# Patient Record
Sex: FEMALE | Race: OTHER | Employment: FULL TIME | URBAN - METROPOLITAN AREA
[De-identification: names, ages, dates, MRNs, and addresses within clinical notes are randomized per-mention and may not be internally consistent; named-entity substitution may affect disease eponyms.]

---

## 2017-01-09 RX ORDER — CITALOPRAM 20 MG/1
TABLET ORAL
Qty: 90 TABLET | Refills: 0 | Status: SHIPPED | OUTPATIENT
Start: 2017-01-09 | End: 2017-05-04 | Stop reason: SDUPTHER

## 2017-01-10 RX ORDER — NORGESTIMATE AND ETHINYL ESTRADIOL 0.25-0.035
KIT ORAL
Qty: 28 TABLET | Refills: 11 | Status: SHIPPED | OUTPATIENT
Start: 2017-01-10 | End: 2017-05-04 | Stop reason: SDUPTHER

## 2017-02-15 ENCOUNTER — OFFICE VISIT (OUTPATIENT)
Dept: INTERNAL MEDICINE CLINIC | Age: 22
End: 2017-02-15

## 2017-02-15 ENCOUNTER — TELEPHONE (OUTPATIENT)
Dept: FAMILY MEDICINE CLINIC | Age: 22
End: 2017-02-15

## 2017-02-15 VITALS
WEIGHT: 287 LBS | HEIGHT: 62 IN | DIASTOLIC BLOOD PRESSURE: 76 MMHG | BODY MASS INDEX: 52.81 KG/M2 | HEART RATE: 73 BPM | SYSTOLIC BLOOD PRESSURE: 124 MMHG | TEMPERATURE: 98 F | OXYGEN SATURATION: 100 %

## 2017-02-15 DIAGNOSIS — Z23 NEED FOR PROPHYLACTIC VACCINATION AGAINST STREPTOCOCCUS PNEUMONIAE (PNEUMOCOCCUS): ICD-10-CM

## 2017-02-15 DIAGNOSIS — B37.9 ANTIBIOTIC-INDUCED YEAST INFECTION: ICD-10-CM

## 2017-02-15 DIAGNOSIS — J45.20 MILD INTERMITTENT ASTHMA WITHOUT COMPLICATION: ICD-10-CM

## 2017-02-15 DIAGNOSIS — J01.10 ACUTE NON-RECURRENT FRONTAL SINUSITIS: Primary | ICD-10-CM

## 2017-02-15 DIAGNOSIS — Z23 NEEDS FLU SHOT: ICD-10-CM

## 2017-02-15 DIAGNOSIS — T36.95XA ANTIBIOTIC-INDUCED YEAST INFECTION: ICD-10-CM

## 2017-02-15 PROCEDURE — 90471 IMMUNIZATION ADMIN: CPT | Performed by: NURSE PRACTITIONER

## 2017-02-15 PROCEDURE — 99213 OFFICE O/P EST LOW 20 MIN: CPT | Performed by: NURSE PRACTITIONER

## 2017-02-15 PROCEDURE — 90732 PPSV23 VACC 2 YRS+ SUBQ/IM: CPT | Performed by: NURSE PRACTITIONER

## 2017-02-15 PROCEDURE — 90472 IMMUNIZATION ADMIN EACH ADD: CPT | Performed by: NURSE PRACTITIONER

## 2017-02-15 PROCEDURE — 90686 IIV4 VACC NO PRSV 0.5 ML IM: CPT | Performed by: NURSE PRACTITIONER

## 2017-02-15 RX ORDER — AMOXICILLIN AND CLAVULANATE POTASSIUM 875; 125 MG/1; MG/1
1 TABLET, FILM COATED ORAL 2 TIMES DAILY
Qty: 20 TABLET | Refills: 0 | Status: SHIPPED | OUTPATIENT
Start: 2017-02-15 | End: 2017-02-25

## 2017-02-15 RX ORDER — ALBUTEROL SULFATE 90 UG/1
2 AEROSOL, METERED RESPIRATORY (INHALATION) EVERY 6 HOURS PRN
Qty: 1 INHALER | Refills: 1 | Status: SHIPPED | OUTPATIENT
Start: 2017-02-15 | End: 2021-11-04

## 2017-02-15 RX ORDER — FLUCONAZOLE 150 MG/1
150 TABLET ORAL ONCE
Qty: 1 TABLET | Refills: 0 | Status: SHIPPED | OUTPATIENT
Start: 2017-02-15 | End: 2017-02-15

## 2017-02-15 ASSESSMENT — ENCOUNTER SYMPTOMS
WHEEZING: 0
SORE THROAT: 1
SINUS PRESSURE: 1
RHINORRHEA: 1
SHORTNESS OF BREATH: 1
HEMOPTYSIS: 0
HEARTBURN: 0
COUGH: 1

## 2017-05-04 RX ORDER — NORGESTIMATE AND ETHINYL ESTRADIOL 0.25-0.035
KIT ORAL
Qty: 84 TABLET | Refills: 0 | Status: SHIPPED | OUTPATIENT
Start: 2017-05-04 | End: 2017-07-05 | Stop reason: SDUPTHER

## 2017-05-04 RX ORDER — CITALOPRAM 20 MG/1
TABLET ORAL
Qty: 90 TABLET | Refills: 0 | Status: SHIPPED | OUTPATIENT
Start: 2017-05-04 | End: 2017-07-05 | Stop reason: SDUPTHER

## 2017-07-06 RX ORDER — NORGESTIMATE AND ETHINYL ESTRADIOL 0.25-0.035
KIT ORAL
Qty: 84 TABLET | Refills: 0 | Status: SHIPPED | OUTPATIENT
Start: 2017-07-06 | End: 2017-09-25 | Stop reason: SDUPTHER

## 2017-07-06 RX ORDER — CITALOPRAM 20 MG/1
TABLET ORAL
Qty: 90 TABLET | Refills: 0 | Status: SHIPPED | OUTPATIENT
Start: 2017-07-06 | End: 2017-10-12 | Stop reason: SDUPTHER

## 2017-08-17 ENCOUNTER — OFFICE VISIT (OUTPATIENT)
Dept: FAMILY MEDICINE CLINIC | Age: 22
End: 2017-08-17

## 2017-08-17 VITALS
BODY MASS INDEX: 52.93 KG/M2 | SYSTOLIC BLOOD PRESSURE: 124 MMHG | WEIGHT: 289.4 LBS | DIASTOLIC BLOOD PRESSURE: 94 MMHG | OXYGEN SATURATION: 98 % | HEART RATE: 68 BPM

## 2017-08-17 DIAGNOSIS — R42 EPISODIC LIGHTHEADEDNESS: ICD-10-CM

## 2017-08-17 DIAGNOSIS — K21.9 GASTROESOPHAGEAL REFLUX DISEASE, ESOPHAGITIS PRESENCE NOT SPECIFIED: ICD-10-CM

## 2017-08-17 DIAGNOSIS — R11.0 NAUSEA: ICD-10-CM

## 2017-08-17 DIAGNOSIS — R10.13 EPIGASTRIC PAIN: ICD-10-CM

## 2017-08-17 DIAGNOSIS — K92.1 BLOOD IN STOOL: ICD-10-CM

## 2017-08-17 DIAGNOSIS — K92.1 BLOOD IN STOOL: Primary | ICD-10-CM

## 2017-08-17 LAB
BASOPHILS ABSOLUTE: 0 K/UL (ref 0–0.2)
BASOPHILS RELATIVE PERCENT: 0.5 %
EOSINOPHILS ABSOLUTE: 0.1 K/UL (ref 0–0.6)
EOSINOPHILS RELATIVE PERCENT: 1.3 %
HCG QUALITATIVE: NEGATIVE
HCT VFR BLD CALC: 36.2 % (ref 36–48)
HEMOGLOBIN: 11.6 G/DL (ref 12–16)
HGB, POC: 11.2
LYMPHOCYTES ABSOLUTE: 2.7 K/UL (ref 1–5.1)
LYMPHOCYTES RELATIVE PERCENT: 35.1 %
MCH RBC QN AUTO: 28.4 PG (ref 26–34)
MCHC RBC AUTO-ENTMCNC: 32.1 G/DL (ref 31–36)
MCV RBC AUTO: 88.4 FL (ref 80–100)
MONOCYTES ABSOLUTE: 0.5 K/UL (ref 0–1.3)
MONOCYTES RELATIVE PERCENT: 6.4 %
NEUTROPHILS ABSOLUTE: 4.3 K/UL (ref 1.7–7.7)
NEUTROPHILS RELATIVE PERCENT: 56.7 %
PDW BLD-RTO: 15.8 % (ref 12.4–15.4)
PLATELET # BLD: 281 K/UL (ref 135–450)
PMV BLD AUTO: 8.9 FL (ref 5–10.5)
RBC # BLD: 4.09 M/UL (ref 4–5.2)
WBC # BLD: 7.6 K/UL (ref 4–11)

## 2017-08-17 PROCEDURE — 85018 HEMOGLOBIN: CPT | Performed by: FAMILY MEDICINE

## 2017-08-17 PROCEDURE — 99214 OFFICE O/P EST MOD 30 MIN: CPT | Performed by: FAMILY MEDICINE

## 2017-08-17 RX ORDER — OMEPRAZOLE 40 MG/1
40 CAPSULE, DELAYED RELEASE ORAL DAILY
Qty: 30 CAPSULE | Refills: 3 | Status: SHIPPED | OUTPATIENT
Start: 2017-08-17 | End: 2017-09-12 | Stop reason: SDUPTHER

## 2017-08-17 RX ORDER — PROMETHAZINE HYDROCHLORIDE 25 MG/1
25 TABLET ORAL EVERY 8 HOURS PRN
Qty: 20 TABLET | Refills: 0 | Status: SHIPPED | OUTPATIENT
Start: 2017-08-17 | End: 2017-08-24

## 2017-08-17 ASSESSMENT — PATIENT HEALTH QUESTIONNAIRE - PHQ9
SUM OF ALL RESPONSES TO PHQ9 QUESTIONS 1 & 2: 5
7. TROUBLE CONCENTRATING ON THINGS, SUCH AS READING THE NEWSPAPER OR WATCHING TELEVISION: 3
9. THOUGHTS THAT YOU WOULD BE BETTER OFF DEAD, OR OF HURTING YOURSELF: 0
6. FEELING BAD ABOUT YOURSELF - OR THAT YOU ARE A FAILURE OR HAVE LET YOURSELF OR YOUR FAMILY DOWN: 2
5. POOR APPETITE OR OVEREATING: 1
3. TROUBLE FALLING OR STAYING ASLEEP: 3
SUM OF ALL RESPONSES TO PHQ QUESTIONS 1-9: 18
10. IF YOU CHECKED OFF ANY PROBLEMS, HOW DIFFICULT HAVE THESE PROBLEMS MADE IT FOR YOU TO DO YOUR WORK, TAKE CARE OF THINGS AT HOME, OR GET ALONG WITH OTHER PEOPLE: 0
1. LITTLE INTEREST OR PLEASURE IN DOING THINGS: 3
4. FEELING TIRED OR HAVING LITTLE ENERGY: 3
8. MOVING OR SPEAKING SO SLOWLY THAT OTHER PEOPLE COULD HAVE NOTICED. OR THE OPPOSITE, BEING SO FIGETY OR RESTLESS THAT YOU HAVE BEEN MOVING AROUND A LOT MORE THAN USUAL: 1
2. FEELING DOWN, DEPRESSED OR HOPELESS: 2

## 2017-08-18 LAB
A/G RATIO: 1.4 (ref 1.1–2.2)
ALBUMIN SERPL-MCNC: 3.7 G/DL (ref 3.4–5)
ALP BLD-CCNC: 101 U/L (ref 40–129)
ALT SERPL-CCNC: 28 U/L (ref 10–40)
ANION GAP SERPL CALCULATED.3IONS-SCNC: 4 MMOL/L (ref 3–16)
AST SERPL-CCNC: 20 U/L (ref 15–37)
BILIRUB SERPL-MCNC: <0.2 MG/DL (ref 0–1)
BUN BLDV-MCNC: 10 MG/DL (ref 7–20)
CALCIUM SERPL-MCNC: 8.9 MG/DL (ref 8.3–10.6)
CHLORIDE BLD-SCNC: 106 MMOL/L (ref 99–110)
CO2: 24 MMOL/L (ref 21–32)
CREAT SERPL-MCNC: <0.5 MG/DL (ref 0.6–1.1)
ESTIMATED AVERAGE GLUCOSE: 108.3 MG/DL
GFR AFRICAN AMERICAN: >60
GFR NON-AFRICAN AMERICAN: >60
GLOBULIN: 2.7 G/DL
GLUCOSE BLD-MCNC: 97 MG/DL (ref 70–99)
HBA1C MFR BLD: 5.4 %
LIPASE: 13 U/L (ref 13–60)
POTASSIUM SERPL-SCNC: 3.9 MMOL/L (ref 3.5–5.1)
SODIUM BLD-SCNC: 134 MMOL/L (ref 136–145)
T4 FREE: 1.2 NG/DL (ref 0.9–1.8)
TOTAL PROTEIN: 6.4 G/DL (ref 6.4–8.2)
TSH REFLEX: 4.44 UIU/ML (ref 0.27–4.2)

## 2017-08-21 ENCOUNTER — TELEPHONE (OUTPATIENT)
Dept: FAMILY MEDICINE CLINIC | Age: 22
End: 2017-08-21

## 2017-09-08 ENCOUNTER — TELEPHONE (OUTPATIENT)
Dept: FAMILY MEDICINE CLINIC | Age: 22
End: 2017-09-08

## 2017-09-08 DIAGNOSIS — R10.13 EPIGASTRIC PAIN: ICD-10-CM

## 2017-09-08 DIAGNOSIS — R11.0 NAUSEA: ICD-10-CM

## 2017-09-08 NOTE — TELEPHONE ENCOUNTER
90 day supply request for authorization omeprazole (PRILOSEC) 40 MG delayed release capsule if appropriate.

## 2017-09-12 RX ORDER — OMEPRAZOLE 40 MG/1
40 CAPSULE, DELAYED RELEASE ORAL DAILY
Qty: 90 CAPSULE | Refills: 1 | Status: SHIPPED | OUTPATIENT
Start: 2017-09-12 | End: 2018-07-06 | Stop reason: ALTCHOICE

## 2017-09-12 NOTE — TELEPHONE ENCOUNTER
Spoke with pharmacy and they stated they can't check to see if it's been approved since they have not received an Rx for a 90 day supply.  Medication teed up for #90

## 2017-09-12 NOTE — TELEPHONE ENCOUNTER
Incoming fax from [facility] would like to know if 90 days supply for Omeprazole Dr Raz Elliott has been approved?  Please advise

## 2017-10-04 ENCOUNTER — TELEPHONE (OUTPATIENT)
Dept: FAMILY MEDICINE CLINIC | Age: 22
End: 2017-10-04

## 2017-10-04 NOTE — TELEPHONE ENCOUNTER
Patient called in regarding FMLA paperwork. Pt states that fmla forms should have been sent over today from her employer, and she will need the paperwork filled out in order to leave work for scheduled appointments with a GI Specialist. Please advise.

## 2017-10-04 NOTE — TELEPHONE ENCOUNTER
Nothing scanned into chart yet. Please forward to Dr. Marquita Martinez once forms have been received.

## 2017-10-05 ENCOUNTER — PAT TELEPHONE (OUTPATIENT)
Dept: PREADMISSION TESTING | Age: 22
End: 2017-10-05

## 2017-10-05 NOTE — PRE-PROCEDURE INSTRUCTIONS
[address] [facility] time____________        Surgery time____________    Take the following medications with a sip of water:    Do not eat or drink anything after 12:00 midnight prior to your surgery. This includes water chewing gum, mints and ice chips. You may brush your teeth and gargle the morning of your surgery, but do not swallow the water     Please see your family doctor/pediatrician for a history and physical and/or concerning medications. Bring any test results/reports from your physicians office. If you are under the care of a heart doctor or specialist doctor, please be aware that you may be asked to them for clearance    You may be asked to stop blood thinners such as Coumadin, Plavix, Fragmin, Lovenox, etc., or any anti-inflammatories such as:  Aspirin, Ibuprofen, Advil, Naproxen prior to your surgery. We also ask that you stop any OTC medications such as fish oil, vitamin E, glucosamine, garlic, Multivitamins, COQ 10, etc.    We ask that you do not smoke 24 hours prior to surgery  We ask that you do not  drink any alcoholic beverages 24 hours prior to surgery     You must make arrangements for a responsible adult to take you home after your surgery. For your safety you will not be allowed to leave alone or drive yourself home. Your surgery will be cancelled if you do not have a ride home. Also for your safety, it is strongly suggested that someone stay with you the first 24 hours after your surgery. A parent or legal guardian must accompany a child scheduled for surgery and plan to stay at the hospital until the child is discharged. Please do not bring other children with you. For your comfort, please wear simple loose fitting clothing to the hospital.  Please do not bring valuables.     Do not wear any make-up or nail polish on your fingers or toes      For your safety, please do not wear any jewelry or body piercing's on the day of surgery. All jewelry must be removed. If you have dentures, they will be removed before going to operating room. For your convenience, we will provide you with a container. If you wear contact lenses or glasses, they will be removed, please bring a case for them. If you have a living will and a durable power of  for healthcare, please bring in a copy. As part of our patient safety program to minimize surgical site infections, we ask you to do the following:    · Please notify your surgeon if you develop any illness between         now and the  day of your surgery. · This includes a cough, cold, fever, sore throat, nausea,         or vomiting, and diarrhea, etc.  ·  Please notify your surgeon if you experience dizziness, shortness         of breath or blurred vision between now and the time of your surgery. Do not shave your operative site 96 hours prior to surgery. For face and neck surgery, men may use an electric razor 48 hours   prior to surgery. You may shower the night before surgery or the morning of   your surgery with an antibacterial soap. You will need to bring a photo ID and insurance card    [facility] has an onsite pharmacy, would you like to utilize our pharmacy     If you will be staying overnight and use a C-pap machine, please bring   your C-pap to hospital     Our goal is to provide you with excellent care, therefore, visitors will be limited to two(2) in the room at a time so that we may focus on providing this care for you. Please contact pre-admission testing if you have any further questions. [facility] phone number:  [phone] [facility] fax number:  [phone]  Please note these are generalized instructions for all surgical cases, you may be provided with more specific instructions according to your surgery.

## 2017-10-06 ENCOUNTER — SURG/PROC ORDERS (OUTPATIENT)
Dept: ANESTHESIOLOGY | Age: 22
End: 2017-10-06

## 2017-10-06 RX ORDER — SODIUM CHLORIDE 0.9 % (FLUSH) 0.9 %
10 SYRINGE (ML) INJECTION EVERY 12 HOURS SCHEDULED
Status: CANCELLED | OUTPATIENT
Start: 2017-10-06

## 2017-10-06 RX ORDER — SODIUM CHLORIDE 0.9 % (FLUSH) 0.9 %
10 SYRINGE (ML) INJECTION PRN
Status: CANCELLED | OUTPATIENT
Start: 2017-10-06

## 2017-10-06 RX ORDER — SODIUM CHLORIDE 9 MG/ML
INJECTION, SOLUTION INTRAVENOUS CONTINUOUS
Status: CANCELLED | OUTPATIENT
Start: 2017-10-06

## 2017-10-09 ENCOUNTER — HOSPITAL ENCOUNTER (OUTPATIENT)
Dept: ENDOSCOPY | Age: 22
Discharge: HOME | End: 2017-10-09
Attending: INTERNAL MEDICINE | Admitting: INTERNAL MEDICINE

## 2017-10-09 VITALS
DIASTOLIC BLOOD PRESSURE: 79 MMHG | RESPIRATION RATE: 12 BRPM | HEART RATE: 75 BPM | TEMPERATURE: 97.8 F | SYSTOLIC BLOOD PRESSURE: 130 MMHG | OXYGEN SATURATION: 100 %

## 2017-10-09 LAB — HCG QUALITATIVE: NEGATIVE

## 2017-10-09 RX ORDER — ONDANSETRON 2 MG/ML
4 INJECTION INTRAMUSCULAR; INTRAVENOUS
Status: ACTIVE | OUTPATIENT
Start: 2017-10-09 | End: 2017-10-09

## 2017-10-09 RX ORDER — SODIUM CHLORIDE 9 MG/ML
INJECTION, SOLUTION INTRAVENOUS CONTINUOUS
Status: DISCONTINUED | OUTPATIENT
Start: 2017-10-09 | End: 2017-10-10 | Stop reason: HOSPADM

## 2017-10-09 RX ORDER — SODIUM CHLORIDE 0.9 % (FLUSH) 0.9 %
10 SYRINGE (ML) INJECTION PRN
Status: DISCONTINUED | OUTPATIENT
Start: 2017-10-09 | End: 2017-10-10 | Stop reason: HOSPADM

## 2017-10-09 RX ORDER — SODIUM CHLORIDE 0.9 % (FLUSH) 0.9 %
10 SYRINGE (ML) INJECTION EVERY 12 HOURS SCHEDULED
Status: DISCONTINUED | OUTPATIENT
Start: 2017-10-09 | End: 2017-10-10 | Stop reason: HOSPADM

## 2017-10-09 RX ADMIN — SODIUM CHLORIDE: 9 INJECTION, SOLUTION INTRAVENOUS at 10:54

## 2017-10-09 ASSESSMENT — PAIN SCALES - GENERAL
PAINLEVEL_OUTOF10: 0

## 2017-10-09 ASSESSMENT — ENCOUNTER SYMPTOMS: SHORTNESS OF BREATH: 0

## 2017-10-09 ASSESSMENT — LIFESTYLE VARIABLES: SMOKING_STATUS: 0

## 2017-10-09 NOTE — PROGRESS NOTES
Received from Endo. VSS. IVF KVO. No c/o pain. abd soft . Arouses easily.   Electronically signed by Dejon Elizondo RN on 10/9/2017 at 12:01 PM

## 2017-10-09 NOTE — PROGRESS NOTES
Discharge instructions given to pt and family. All verbalize an understanding of instructions. IV d/c'd. Up to chair. Gait steady.

## 2017-10-09 NOTE — H&P
Pre-operative History and Physical    Patient: Thomas Anne  : 1995  Acct#:     Intended Procedure:  EGD/Colonoscopy    HISTORY OF PRESENT ILLNESS:  The patient is a 19 y.o. female  who presents for/due to Nausea/Rectal bleeding      Past Medical History:        Diagnosis Date    Asthma     GERD (gastroesophageal reflux disease)     Morbid obesity ([address]     Otitis media     frequent    Polycystic ovarian disease     Vaginal yeast infection     frequently     Past Surgical History:        Procedure Laterality Date    UPPER GASTROINTESTINAL ENDOSCOPY       Medications Prior to Admission:   Current Outpatient Prescriptions on File Prior to Encounter   Medication Sig Dispense Refill    [address] [address] 0.25-35 MG-MCG per tablet TAKE 1 TABLET BY MOUTH EVERY DAY 84 tablet 2    omeprazole (PRILOSEC) 40 MG delayed release capsule Take 1 capsule by mouth daily 90 capsule 1    metFORMIN (GLUCOPHAGE) 500 MG tablet TAKE 1 TABLET BY MOUTH ONE TIME A DAY WITH BREAKFAST 30 tablet 0    citalopram (CELEXA) 20 MG tablet TAKE 1 TABLET BY MOUTH EVERY DAY 90 tablet 0    albuterol sulfate HFA (PROAIR HFA) 108 (90 BASE) MCG/ACT inhaler Inhale 2 puffs into the lungs every 6 hours as needed for Wheezing 1 Inhaler 1    ondansetron (ZOFRAN) 4 MG tablet Take 1 tablet by mouth daily as needed for Nausea or Vomiting 30 tablet 0    traZODone (DESYREL) 50 MG tablet TAKE 1-2 TABLETS BY MOUTH EVERY DAY IN THE EVENING 60 tablet 3     No current facility-administered medications on file prior to encounter. Allergies:  Review of patient's allergies indicates no known allergies. Social History:   TOBACCO:   reports that she has never smoked. She has never used smokeless tobacco.  ETOH:   reports that she does not drink alcohol. DRUGS:   reports that she does not use drugs. PHYSICAL EXAM:      Vital Signs: There were no vitals taken for this visit. Airway: No stridor or wheezing noted.   Good air

## 2017-10-09 NOTE — ANESTHESIA POST-OP
[facility] Department of Anesthesiology  Post-Anesthesia Note       Name:  Teddy Villeda                                         Age:  19 y.o.   MRN:  [id number]     Last Vitals & Oxygen Saturation: /73   Pulse 83   Temp 97.8 °F (36.6 °C) (Temporal)   Resp 16   SpO2 99%   Patient Vitals for the past 4 hrs:   BP Temp Temp src Pulse Resp SpO2   10/09/17 1216 122/73 97.8 °F (36.6 °C) Temporal 83 16 99 %   10/09/17 1210 - - - 62 17 100 %   10/09/17 1206 - 97.6 °F (36.4 °C) Temporal - - -   10/09/17 1205 (!) 104/54 - - 82 16 -   10/09/17 1200 (!) 100/51 - - 80 18 100 %   10/09/17 1155 (!) 97/51 - - 78 21 100 %   10/09/17 1151 (!) 95/48 97.6 °F (36.4 °C) Temporal 83 11 100 %       Level of consciousness: awake, alert and oriented    Respiratory: stable     Cardiovascular: stable     Hydration: stable     PONV: stable     Post-op pain: adequate analgesia    Post-op assessment: no apparent anesthetic complications and tolerated procedure well    Complications:  none    Elba Major MD  October 9, 2017   12:35 PM

## 2017-10-09 NOTE — PROGRESS NOTES
Arouses easily. abd soft. VSS. IVF KVO.    Electronically signed by Shira Barba RN on 10/9/2017 at 12:07 PM

## 2017-10-09 NOTE — PROGRESS NOTES
1.  Patient is identified using name and date of birth. 2.  The patient is free from signs and symptoms of injury. 3.  The patient receives appropriate medication(s), safely administered during the perioperative period. 4.  The patient had wound/tissuue perfusion consistent with or improved from baseline levels established preoperatively. 5.  The patient is at or returning to normothermia at the conclusion of the immediate postoperative period. 6.  The patient's fluid, electrolyte, and acid base balances are consistent with or improved from baseline levels established preoperatively. 7.  The patient's pulmonary function is consistent with or improved from baseline levels established preoperatively. 8.  The patient's cardiovascular status is consistent with or improved from baseline levels established preoperatively. 9.  The patient/caregiver participates in decisions affecting his or her perioperative care. 10. The patient's care is consistent with the individualized perioperative plan of care. 11. The patient's right to privacy is maintained. 12. The patient is the recipient of competent and ethical care within legal standards of practice. 13.  The patient's value system, lifestyle, ethnicity, and culture are considered, respected, and incorporated in the perioperative plan of care. 14.  The patient demonstrates and/or reports adequate pain control throughout the perioperative period. 15. The patient's neurological status is consistent with or improved from baseline levels established preoperatively. 16.  The patient/caregiver demonstrates knowledge of the expected responses to the operative or invasive procedure. 16.  Patient/caregiver has reduced anxiety. Interventions - familiarize with environment and equipment.

## 2017-10-11 ENCOUNTER — TELEPHONE (OUTPATIENT)
Dept: FAMILY MEDICINE CLINIC | Age: 22
End: 2017-10-11

## 2017-10-13 RX ORDER — CITALOPRAM 20 MG/1
TABLET ORAL
Qty: 90 TABLET | Refills: 0 | Status: SHIPPED | OUTPATIENT
Start: 2017-10-13 | End: 2018-01-16 | Stop reason: SDUPTHER

## 2017-10-27 ENCOUNTER — TELEPHONE (OUTPATIENT)
Dept: FAMILY MEDICINE CLINIC | Age: 22
End: 2017-10-27

## 2017-10-27 NOTE — TELEPHONE ENCOUNTER
Pt's mother is calling says that Dr. Lawrence Werner needs to change the frequency on [employer] forms regarding how often Petros Munguia should be allowed to take off. Would like 3 times weeks reason Mom has a lot of appointment to attend.

## 2017-10-30 ENCOUNTER — TELEPHONE (OUTPATIENT)
Dept: FAMILY MEDICINE CLINIC | Age: 22
End: 2017-10-30

## 2017-10-30 ENCOUNTER — OFFICE VISIT (OUTPATIENT)
Dept: FAMILY MEDICINE CLINIC | Age: 22
End: 2017-10-30

## 2017-10-30 VITALS
RESPIRATION RATE: 15 BRPM | SYSTOLIC BLOOD PRESSURE: 128 MMHG | DIASTOLIC BLOOD PRESSURE: 82 MMHG | HEART RATE: 75 BPM | WEIGHT: 292.4 LBS | OXYGEN SATURATION: 95 % | BODY MASS INDEX: 51.8 KG/M2

## 2017-10-30 DIAGNOSIS — N89.8 VAGINAL DISCHARGE: Primary | ICD-10-CM

## 2017-10-30 DIAGNOSIS — M79.2 NERVE PAIN: ICD-10-CM

## 2017-10-30 DIAGNOSIS — K62.5 RECTAL BLEEDING: ICD-10-CM

## 2017-10-30 DIAGNOSIS — R00.2 PALPITATION: ICD-10-CM

## 2017-10-30 DIAGNOSIS — R11.2 NON-INTRACTABLE VOMITING WITH NAUSEA, UNSPECIFIED VOMITING TYPE: ICD-10-CM

## 2017-10-30 PROCEDURE — 90471 IMMUNIZATION ADMIN: CPT | Performed by: FAMILY MEDICINE

## 2017-10-30 PROCEDURE — 99214 OFFICE O/P EST MOD 30 MIN: CPT | Performed by: FAMILY MEDICINE

## 2017-10-30 PROCEDURE — 90630 INFLUENZA, QUADV, 18-64 YRS, ID, PF, MICRO INJ, 0.1ML (FLUZONE QUADV, PF): CPT | Performed by: FAMILY MEDICINE

## 2017-10-30 RX ORDER — METRONIDAZOLE 500 MG/1
500 TABLET ORAL 2 TIMES DAILY
Qty: 14 TABLET | Refills: 0 | Status: SHIPPED | OUTPATIENT
Start: 2017-10-30 | End: 2017-11-06

## 2017-10-30 NOTE — TELEPHONE ENCOUNTER
Patient saw Dr. Diamond Loomis today and received an antibiotic. She has a history of getting yeast infections after taking antibiotics. Can Dr. Diamond Loomis prescribe something for a yeast infection as a precaution? [facility] pharmacy confirmed.

## 2017-10-30 NOTE — TELEPHONE ENCOUNTER
Traci Bermudez returned call and states that she sees specialists with [facility], podiatry, gastroenterology, urology, and nephrology who are not in the [facility] system. Will Dr. Davis Matias reconsider?

## 2017-10-30 NOTE — TELEPHONE ENCOUNTER
Please find out dates of office visits in 2017 from all her specialists. This enc needs to be closed and reopened/copied under her mom's chart since it is pertaining to her. Please send back to me when we have dates of all of these visits.

## 2017-10-30 NOTE — PROGRESS NOTES
DAY 90 tablet 0    SPRINTEC 28 0.25-35 MG-MCG per tablet TAKE 1 TABLET BY MOUTH EVERY DAY 84 tablet 2    omeprazole (PRILOSEC) 40 MG delayed release capsule Take 1 capsule by mouth daily 90 capsule 1    metFORMIN (GLUCOPHAGE) 500 MG tablet TAKE 1 TABLET BY MOUTH ONE TIME A DAY WITH BREAKFAST 30 tablet 0    albuterol sulfate HFA (PROAIR HFA) 108 (90 BASE) MCG/ACT inhaler Inhale 2 puffs into the lungs every 6 hours as needed for Wheezing 1 Inhaler 1    ondansetron (ZOFRAN) 4 MG tablet Take 1 tablet by mouth daily as needed for Nausea or Vomiting 30 tablet 0    traZODone (DESYREL) 50 MG tablet TAKE 1-2 TABLETS BY MOUTH EVERY DAY IN THE EVENING 60 tablet 3     No current facility-administered medications for this visit. OBJECTIVE:  /82 (Site: Left Arm, Position: Sitting, Cuff Size: Large Adult)   Pulse 75   Resp 15   Wt 292 lb 6.4 oz (132.6 kg)   LMP 10/05/2017 (Exact Date)   SpO2 95%   BMI 51.80 kg/m²   GEN:  WDWN, NAD, morbidly obese  HEENT:  NCAT, TM/OP nl, PERRL, EOMI. NECK:  Supple without adenopathy. CV:  Regular rate and rhythm, S1 and S2 normal, no murmurs, clicks, gallops or rubs. No edema. PULM:  Chest is clear, no wheezing or rales. Normal symmetric air entry throughout both lung fields. ABD: soft, non-tender, non-distended. Normal bowel sounds. No organomegaly   : thin white milky malodorous discharge noted. Excoriations present on external genitalia. No rash present. PSYCH: normal mood and affect. Intact judgement and insight  NEURO: A&O x 3. MS 5/5 in all extremities. Normal gait    ASSESSMENT/PLAN:  1. Vaginal discharge  Likely BV. rx for flagyl sent    2. Palpitation  Pt declines EKG/furhter workup    3. Nerve pain  Uncertain etiology. Vague symptoms. possibel intermittent arrhythmia. Pt declines cardio workup today. Consider neuro referral if continues. May be panic attack as well.  Pt instructed to keep log of events with associated triggers/specific symptoms,

## 2017-10-31 NOTE — TELEPHONE ENCOUNTER
Conversation copied and pasted into an encounter in mothers (tito) chart.  Enc closed and will continue conversation in that encounter

## 2018-01-17 ENCOUNTER — HOSPITAL ENCOUNTER (OUTPATIENT)
Dept: OTHER | Age: 23
Discharge: HOME | End: 2018-01-17
Attending: INTERNAL MEDICINE | Admitting: INTERNAL MEDICINE

## 2018-01-17 RX ORDER — CITALOPRAM 20 MG/1
TABLET ORAL
Qty: 90 TABLET | Refills: 0 | Status: SHIPPED | OUTPATIENT
Start: 2018-01-17 | End: 2018-04-23 | Stop reason: SDUPTHER

## 2018-01-18 LAB
EKG ATRIAL RATE: 79 BPM
EKG DIAGNOSIS: NORMAL
EKG P AXIS: 41 DEGREES
EKG P-R INTERVAL: 142 MS
EKG Q-T INTERVAL: 370 MS
EKG QRS DURATION: 82 MS
EKG QTC CALCULATION (BAZETT): 424 MS
EKG R AXIS: 29 DEGREES
EKG T AXIS: 8 DEGREES
EKG VENTRICULAR RATE: 79 BPM

## 2018-01-18 PROCEDURE — 93010 ELECTROCARDIOGRAM REPORT: CPT | Performed by: INTERNAL MEDICINE

## 2018-04-23 RX ORDER — CITALOPRAM 20 MG/1
TABLET ORAL
Qty: 90 TABLET | Refills: 1 | Status: SHIPPED | OUTPATIENT
Start: 2018-04-23 | End: 2018-07-06 | Stop reason: SDUPTHER

## 2018-07-06 ENCOUNTER — OFFICE VISIT (OUTPATIENT)
Dept: FAMILY MEDICINE CLINIC | Age: 23
End: 2018-07-06

## 2018-07-06 VITALS
OXYGEN SATURATION: 99 % | SYSTOLIC BLOOD PRESSURE: 120 MMHG | TEMPERATURE: 98.4 F | WEIGHT: 280 LBS | BODY MASS INDEX: 49.6 KG/M2 | HEART RATE: 85 BPM | DIASTOLIC BLOOD PRESSURE: 80 MMHG

## 2018-07-06 DIAGNOSIS — E28.2 POLYCYSTIC OVARIAN DISEASE: ICD-10-CM

## 2018-07-06 DIAGNOSIS — F32.A DEPRESSION, UNSPECIFIED DEPRESSION TYPE: ICD-10-CM

## 2018-07-06 DIAGNOSIS — R07.89 ATYPICAL CHEST PAIN: Primary | ICD-10-CM

## 2018-07-06 PROCEDURE — 99214 OFFICE O/P EST MOD 30 MIN: CPT | Performed by: FAMILY MEDICINE

## 2018-07-06 RX ORDER — CITALOPRAM 20 MG/1
TABLET ORAL
Qty: 30 TABLET | Refills: 3 | Status: SHIPPED | OUTPATIENT
Start: 2018-07-06 | End: 2019-12-30 | Stop reason: SDUPTHER

## 2018-07-06 NOTE — PATIENT INSTRUCTIONS
\"I am hopeful\"; \"Things will get better\"; and \"I can ask for the help I need. \" Write down these statements and read them often, even if you don't believe them yet. · Be patient with yourself. It took time for your depression to develop, and it will take time for your symptoms to improve. Do not take on too much or be too hard on yourself. · Learn all you can about depression from written and online materials. · Check out behavioral health classes to learn more about dealing with depression. · Keep the numbers for these national suicide hotlines: 7-018-739-TALK (4-811.699.3999) and 5-122-CHDKREM (0-317.303.7534). If you or someone you know talks about suicide or feeling hopeless, get help right away. When should you call for help? Call 911 anytime you think you may need emergency care. For example, call if:    · You feel you cannot stop from hurting yourself or someone else.   [facility] your doctor now or seek immediate medical care if:    · You hear voices.     · You feel much more depressed.    Watch closely for changes in your health, and be sure to contact your doctor if:    · You are having problems with your depression medicine.     · You are not getting better as expected. Where can you learn more? Go to https://[employer].[employer]. org and sign in to your [employer] account. Enter G550 in the [employer] box to learn more about \"Depression Treatment: Care Instructions. \"     If you do not have an account, please click on the \"Sign Up Now\" link. Current as of: December 7, 2017  Content Version: 11.6  © 4660-2658 [employer]. Care instructions adapted under license by [facility] ([facility]). If you have questions about a medical condition or this instruction, always ask your healthcare professional. [address] any warranty or liability for your use of this information.

## 2019-01-17 ENCOUNTER — TELEPHONE (OUTPATIENT)
Dept: FAMILY MEDICINE CLINIC | Age: 24
End: 2019-01-17

## 2019-05-29 ENCOUNTER — OFFICE VISIT (OUTPATIENT)
Dept: FAMILY MEDICINE CLINIC | Age: 24
End: 2019-05-29

## 2019-05-29 VITALS
OXYGEN SATURATION: 91 % | WEIGHT: 279 LBS | HEART RATE: 112 BPM | SYSTOLIC BLOOD PRESSURE: 110 MMHG | DIASTOLIC BLOOD PRESSURE: 84 MMHG | BODY MASS INDEX: 49.42 KG/M2

## 2019-05-29 DIAGNOSIS — T78.40XA ALLERGIC REACTION, INITIAL ENCOUNTER: Primary | ICD-10-CM

## 2019-05-29 DIAGNOSIS — Z00.00 ANNUAL PHYSICAL EXAM: ICD-10-CM

## 2019-05-29 PROCEDURE — 99213 OFFICE O/P EST LOW 20 MIN: CPT | Performed by: FAMILY MEDICINE

## 2019-05-29 RX ORDER — PREDNISONE 10 MG/1
TABLET ORAL
Qty: 20 TABLET | Refills: 0 | Status: SHIPPED | OUTPATIENT
Start: 2019-05-29 | End: 2019-12-30

## 2019-05-29 ASSESSMENT — PATIENT HEALTH QUESTIONNAIRE - PHQ9
SUM OF ALL RESPONSES TO PHQ QUESTIONS 1-9: 2
2. FEELING DOWN, DEPRESSED OR HOPELESS: 2
1. LITTLE INTEREST OR PLEASURE IN DOING THINGS: 0
SUM OF ALL RESPONSES TO PHQ QUESTIONS 1-9: 2
SUM OF ALL RESPONSES TO PHQ9 QUESTIONS 1 & 2: 2

## 2019-05-29 NOTE — PATIENT INSTRUCTIONS
Patient Education        Allergic Reaction: Care Instructions  Your Care Instructions    An allergic reaction is an excessive response from your immune system to a medicine, chemical, food, insect bite, or other substance. A reaction can range from mild to life-threatening. Some people have a mild rash, hives, and itching or stomach cramps. In severe reactions, swelling of your tongue and throat can close up your airway so that you cannot breathe. Follow-up care is a key part of your treatment and safety. Be sure to make and go to all appointments, and call your doctor if you are having problems. It's also a good idea to know your test results and keep a list of the medicines you take. How can you care for yourself at home? · If you know what caused your allergic reaction, be sure to avoid it. Your allergy may become more severe each time you have a reaction. · Take an over-the-counter antihistamine, such as cetirizine (Zyrtec) or loratadine (Claritin), to treat mild symptoms. Read and follow directions on the label. Some antihistamines can make you feel sleepy. Do not give antihistamines to a child unless you have checked with your doctor first. Mild symptoms include sneezing or an itchy or runny nose; an itchy mouth; a few hives or mild itching; and mild nausea or stomach discomfort. · Do not scratch hives or a rash. Put a cold, moist towel on them or take cool baths to relieve itching. Put ice packs on hives, swelling, or insect stings for 10 to 15 minutes at a time. Put a thin cloth between the ice pack and your skin. Do not take hot baths or showers. They will make the itching worse. · Your doctor may prescribe a shot of epinephrine to carry with you in case you have a severe reaction. Learn how to give yourself the shot and keep it with you at all times. Make sure it is not .   · Go to the emergency room every time you have a severe reaction, even if you have used your shot of epinephrine and are this instruction, always ask your healthcare professional. [address] [address] any warranty or liability for your use of this information.

## 2019-05-29 NOTE — PROGRESS NOTES
[address] Note    Date: 5/29/2019                                               Subjective/Objective:     Chief Complaint   Patient presents with    Insect Bite     Monday- hurting/itching numb/tingling. cold hard to  thongs. HPI   Redness and swelling of skin on left forearm following a bug bite 2 days ago. Redness seemed to start a few hours after she was bit. Denies shortness of breath. Does have some itching and pain and numbness and tingling in the left forearm. Patient Active Problem List    Diagnosis Date Noted    Polycystic ovarian disease     GERD (gastroesophageal reflux disease)     Asthma     Obesity, Class II, BMI 35-39.9        Past Medical History:   Diagnosis Date    Asthma     GERD (gastroesophageal reflux disease)     Morbid obesity ([facility] [address]     Otitis media     frequent    Polycystic ovarian disease     Vaginal yeast infection     frequently       Current Outpatient Medications   Medication Sig Dispense Refill    predniSONE (DELTASONE) 10 MG tablet Take 4 tablets on days 1-2, 3 tab on days 3-4, 2 tab on days 5-6, and 1 tab on days 7-8 20 tablet 0    citalopram (CELEXA) 20 MG tablet TAKE 1 TABLET BY MOUTH EVERY DAY 30 tablet 3    metFORMIN (GLUCOPHAGE) 500 MG tablet TAKE 1 TABLET BY MOUTH ONE TIME A DAY WITH BREAKFAST 90 tablet 3    SPRINTEC 28 0.25-35 MG-MCG per tablet TAKE 1 TABLET BY MOUTH EVERY DAY 84 tablet 2    albuterol sulfate HFA (PROAIR HFA) 108 (90 BASE) MCG/ACT inhaler Inhale 2 puffs into the lungs every 6 hours as needed for Wheezing 1 Inhaler 1    traZODone (DESYREL) 50 MG tablet TAKE 1-2 TABLETS BY MOUTH EVERY DAY IN THE EVENING 60 tablet 3     No current facility-administered medications for this visit.         No Known Allergies    Review of Systems   No fever, no vomiting    Vitals:  /84   Pulse 112   Wt 279 lb (126.6 kg)   SpO2 91%   BMI 49.42 kg/m²     Physical Exam   General:  Well-appearing, NAD, alert,

## 2019-08-01 LAB
CANDIDA SPECIES, DNA PROBE: NORMAL
GARDNERELLA VAGINALIS, DNA PROBE: NORMAL
TRICHOMONAS VAGINALIS DNA: NORMAL

## 2019-08-02 LAB — URINE CULTURE, ROUTINE: NORMAL

## 2019-09-12 DIAGNOSIS — E28.2 POLYCYSTIC OVARIAN DISEASE: ICD-10-CM

## 2019-12-30 ENCOUNTER — OFFICE VISIT (OUTPATIENT)
Dept: FAMILY MEDICINE CLINIC | Age: 24
End: 2019-12-30
Payer: COMMERCIAL

## 2019-12-30 ENCOUNTER — TELEPHONE (OUTPATIENT)
Dept: FAMILY MEDICINE CLINIC | Age: 24
End: 2019-12-30

## 2019-12-30 VITALS
DIASTOLIC BLOOD PRESSURE: 88 MMHG | TEMPERATURE: 98.2 F | WEIGHT: 258 LBS | SYSTOLIC BLOOD PRESSURE: 120 MMHG | OXYGEN SATURATION: 98 % | BODY MASS INDEX: 45.7 KG/M2 | HEART RATE: 84 BPM

## 2019-12-30 DIAGNOSIS — R05.9 COUGH: Primary | ICD-10-CM

## 2019-12-30 DIAGNOSIS — F32.A DEPRESSION, UNSPECIFIED DEPRESSION TYPE: ICD-10-CM

## 2019-12-30 DIAGNOSIS — R50.9 FEVER, UNSPECIFIED: ICD-10-CM

## 2019-12-30 DIAGNOSIS — J40 BRONCHITIS: ICD-10-CM

## 2019-12-30 PROCEDURE — 99213 OFFICE O/P EST LOW 20 MIN: CPT | Performed by: FAMILY MEDICINE

## 2019-12-30 RX ORDER — BENZONATATE 100 MG/1
100 CAPSULE ORAL 3 TIMES DAILY PRN
Qty: 15 CAPSULE | Refills: 0 | Status: SHIPPED | OUTPATIENT
Start: 2019-12-30 | End: 2020-01-04

## 2019-12-30 RX ORDER — FLUCONAZOLE 100 MG/1
100 TABLET ORAL ONCE
Qty: 1 TABLET | Refills: 0 | Status: SHIPPED | OUTPATIENT
Start: 2019-12-30 | End: 2019-12-30

## 2019-12-30 RX ORDER — DOXYCYCLINE HYCLATE 100 MG
100 TABLET ORAL 2 TIMES DAILY
Qty: 20 TABLET | Refills: 0 | Status: SHIPPED | OUTPATIENT
Start: 2019-12-30 | End: 2020-01-09

## 2019-12-30 RX ORDER — CITALOPRAM 20 MG/1
TABLET ORAL
Qty: 30 TABLET | Refills: 3 | Status: SHIPPED | OUTPATIENT
Start: 2019-12-30 | End: 2020-05-19 | Stop reason: SDUPTHER

## 2020-01-08 ENCOUNTER — TELEPHONE (OUTPATIENT)
Dept: FAMILY MEDICINE CLINIC | Age: 25
End: 2020-01-08

## 2020-01-13 ENCOUNTER — OFFICE VISIT (OUTPATIENT)
Dept: FAMILY MEDICINE CLINIC | Age: 25
End: 2020-01-13
Payer: COMMERCIAL

## 2020-01-13 VITALS — HEART RATE: 77 BPM | OXYGEN SATURATION: 99 %

## 2020-01-13 PROCEDURE — 99213 OFFICE O/P EST LOW 20 MIN: CPT | Performed by: FAMILY MEDICINE

## 2020-01-13 ASSESSMENT — PATIENT HEALTH QUESTIONNAIRE - PHQ9: DEPRESSION UNABLE TO ASSESS: PT REFUSES

## 2020-01-13 NOTE — PROGRESS NOTES
[address] Note    Date: 1/13/2020                                               Subjective/Objective:     Chief Complaint   Patient presents with    Forms     FMLA        HPI   Patient is here for follow-up on bronchitis. Was treated with doxycycline about 2 weeks ago. Symptoms have improved. Denies any fever or shortness of breath. His FMLA paperwork for her mother. .  Needs time off work to take care of her mother and take her back and forth to doctors appointments. She has visual impairments and type 2 diabetes among other illnesses. Patient Active Problem List    Diagnosis Date Noted    Polycystic ovarian disease     GERD (gastroesophageal reflux disease)     Asthma     Obesity, Class II, BMI 35-39.9        Past Medical History:   Diagnosis Date    Asthma     GERD (gastroesophageal reflux disease)     Morbid obesity ([facility] [address] [facility]     Otitis media     frequent    Polycystic ovarian disease     Vaginal yeast infection     frequently       Current Outpatient Medications   Medication Sig Dispense Refill    citalopram (CELEXA) 20 MG tablet TAKE 1 TABLET BY MOUTH EVERY DAY 30 tablet 3    metFORMIN (GLUCOPHAGE) 500 MG tablet TAKE 1 TABLET BY MOUTH ONE TIME A DAY WITH BREAKFAST 90 tablet 3    SPRINTEC 28 0.25-35 MG-MCG per tablet TAKE 1 TABLET BY MOUTH EVERY DAY 84 tablet 2    albuterol sulfate HFA (PROAIR HFA) 108 (90 BASE) MCG/ACT inhaler Inhale 2 puffs into the lungs every 6 hours as needed for Wheezing 1 Inhaler 1    traZODone (DESYREL) 50 MG tablet TAKE 1-2 TABLETS BY MOUTH EVERY DAY IN THE EVENING 60 tablet 3     No current facility-administered medications for this visit. No Known Allergies    Review of Systems   No vomiting, no wheeze  Vitals:  Pulse 77   LMP 01/09/2020 (Exact Date)   SpO2 99%   Breastfeeding? No     Physical Exam   General:  Well-appearing, NAD, alert, non-toxic  HEENT:  Normocephalic, atraumatic.    CHEST/LUNGS: No dyspnea  EXTREMETIES: Normal movement of all extremities. No edema. No joint swelling. SKIN:  No rash, no cellulitis, no bruising, no petechiae/purpura/vesicles/pustules/abscess  PSYCH:  A+O x 3; normal affect  NEURO:  GCS 15, CN2-12 grossly intact, no focal motor/sensory deficits, normal gait, normal speech        Assessment/Plan     17-year-old female with acute bronchitis, improving. Recommend rest, fluids, expectant management. [place] paperwork completed for patient to take care of her mother and her doctors appointments. Discharge in stable condition at 6:43 PM.    1. Cough      2. Bronchitis         No orders of the defined types were placed in this encounter. No follow-ups on file.     Marshall Cardenas MD    1/13/2020  6:44 PM

## 2020-05-19 ENCOUNTER — VIRTUAL VISIT (OUTPATIENT)
Dept: FAMILY MEDICINE CLINIC | Age: 25
End: 2020-05-19
Payer: COMMERCIAL

## 2020-05-19 ENCOUNTER — TELEPHONE (OUTPATIENT)
Dept: FAMILY MEDICINE CLINIC | Age: 25
End: 2020-05-19

## 2020-05-19 VITALS — TEMPERATURE: 97 F

## 2020-05-19 PROCEDURE — 99213 OFFICE O/P EST LOW 20 MIN: CPT | Performed by: FAMILY MEDICINE

## 2020-05-19 RX ORDER — CITALOPRAM 40 MG/1
TABLET ORAL
Qty: 30 TABLET | Refills: 5 | Status: SHIPPED | OUTPATIENT
Start: 2020-05-19 | End: 2020-12-21 | Stop reason: SDUPTHER

## 2020-05-19 ASSESSMENT — PATIENT HEALTH QUESTIONNAIRE - PHQ9
2. FEELING DOWN, DEPRESSED OR HOPELESS: 1
SUM OF ALL RESPONSES TO PHQ9 QUESTIONS 1 & 2: 2
1. LITTLE INTEREST OR PLEASURE IN DOING THINGS: 1
SUM OF ALL RESPONSES TO PHQ QUESTIONS 1-9: 2
SUM OF ALL RESPONSES TO PHQ QUESTIONS 1-9: 2

## 2020-05-26 ENCOUNTER — TELEPHONE (OUTPATIENT)
Dept: FAMILY MEDICINE CLINIC | Age: 25
End: 2020-05-26

## 2020-06-03 NOTE — TELEPHONE ENCOUNTER
[facility] PT ORTHO - HIP Treatment  Plan of Care Note    Ambulation Distance (Feet): 60 Feet (03/17/19 1400)     Patient seen on 4C nursing unit.    POD #: 2    DIAGNOSIS: right THR    ASSESSMENT:   Emphasis of session included ambulation to bathroom and in hallway and review of home exercise program. Patient reported good pain control at start of session with reports of slight increase in pain during activity. Patient fatigued quickly with ambulation of 60 feet using 2ww and close supervision. She reported slight ache in her left knee during ambulation but did not show significant signs of weakness or instability in this extremity. Patient demonstrates slow sit to/from transfers but shows good compliance to hip precautions and maintains safety during these transfers. Performed seated exercises including glute sets, quad sets, and ankle pumps, patient demonstrated understanding of these exercises. Patient would continue to benefit from skilled physical therapy to improve functional mobility to ensure safety when discharged.       PT Identified Barriers to Discharge: deconditioning due to chronic orthopedic conditions, post surgery pain control and history of falls     PLAN:   Continue skilled PT, including the following Treatment/Interventions: Functional transfer training;Strengthening;ROM;Patient/Family training;Gait training;Stairs retraining;Safety Education;Neuromuscular re-education (03/17/19 1400)   PT Frequency: 7 days/week (03/17/19 1400)    Treatment Plan for Next Session: Ambulate to [facility] for stairs trial, ask about walker if found  Additional Plan Considerations: HEP handout in room       Co-morbidities:   Patient Active Problem List   Diagnosis   • Left knee pain, prob DJD   • Severe obesity, BMI=50-51   • DJD bilateral knees       SUBJECTIVE: Subjective: Patient agreeable to therapy, stated she had recently taken pain meds and was feeling up to some walking (03/17/19 1400)    OBJECTIVE:  Precautions:   Precautions  Hip Precautions: Posterior precautions (03/17/19 1400)  Weight Bearing Status: Weight bearing as tolerated right lower extremity (03/17/19 1400)  Other Precautions: Safety, fall risk (03/17/19 1400)  Precautions Compliance:  Good  RN reported Hopson Fall Scale Score: 70 (>45 is considered at risk of fall)  Pain:  Comfort/Function (Pain) SCORE with Activity: 5 (03/17/19 1400)  Vitals:  Vital Signs: Asymptomatic (03/17/19 1400)  Activity Tolerance:  limited by post surgical pain, deconditioning  Exercise:  Exercise Comments: Educated in performance of glute sets, quad sets, supine hip abduction, and ankle pumps, supervision for cues on how to improve technique (03/17/19 1400)    Functional Mobility (as of date/time noted)  Bed Mobility:   Supine to Sit: Minimal Assist (Min)(Assistance moving right lower extremity) (03/15/19 1500)  Sit to Supine: Minimal Assist (Min)(Assistance moving right lower extremity) (03/15/19 1500)    Transfer:   Sit to Stand: Supervision (Supv) (03/17/19 1400)  Stand to Sit: Supervision (Supv) (03/17/19 1400)  Assistive Device/: 2-wheeled walker (03/17/19 1400)  Transfer Comments 1: Verbal cues to maintain hip precautions (03/17/19 1400)    Ambulation:  Gait Assistance: Supervision (Supv) (03/17/19 1400)  Assistive Device/: 2-wheeled walker (03/17/19 1400)  Ambulation Distance (Feet): 60 Feet (03/17/19 1400)  Gait Comments 1: Step to gait leading with RLE with slow gait speed and minimal weight shift to the right secondary to pain and fatigue (03/17/19 1400)  Gait Comments 2: No losses of balance or signs of instability  (03/17/19 1400)    Stair Negotiation:       See PT flowsheet for full details regarding the PT therapy provided.    GOALS:  Short Term Goals to Be Reviewed On: 03/22/19 (03/15/19 1500)  Short Term Goals = Discharge Goals: Yes (03/17/19 1400)  Goal Agreement: Patient agrees with goals and treatment plan (03/17/19 1400)  Bed Mobility  Discharge Goal: Modified independent  (03/17/19 1400)  Transfer Discharge Goal: Modified independent with least restrictive device (03/17/19 1400)  Ambulation Discharge Goal: 200 feet with least restricitive device (03/17/19 1400)    EDUCATION:   On this date, the patient was educated on hip precautions and benefits of activity and exercises.    The response to education was: Verbalizes understanding and Demonstrates understanding    RECOMMENDATIONS FOR DISCHARGE:  Recommendation for Discharge: PT: Post acute therapy (03/17/19 1400)    PT/OT Mobility Equipment for Discharge: Continue to assess, has 4ww and cane at home. Also states has 2ww walker but will need to be brought out from storage (03/17/19 1400)  PT/OT ADL Equipment for Discharge: Will require toilet riser, LE AE: pending d/c destination (03/17/19 1400)     Geeta Davila, MELI   Pager [phone]    The above documentation has been reviewed by a licensed physical therapist and is in accordance with plan of care approved by a licensed physical therapist.   --------------------------------------------------------  Sera Dick DPT  Pager [phone]    Contact rehab services zone phone [phone] for priority needs    PT Accountability  Shift of responsibility: [id number] (03/17/19 0000)  Routine Standards Maintained?: Routine standards maintained (03/17/19 0000)

## 2020-09-24 NOTE — TELEPHONE ENCOUNTER
Medication and Quantity requested: Metformin  mg tab. ,#90 w/ 3 refills     Last Visit  5-19-20,Dr. Briseyda Torres and phone number updated in EPIC:  [facility]

## 2020-10-22 ENCOUNTER — OFFICE VISIT (OUTPATIENT)
Dept: FAMILY MEDICINE CLINIC | Age: 25
End: 2020-10-22
Payer: COMMERCIAL

## 2020-10-22 VITALS
OXYGEN SATURATION: 98 % | TEMPERATURE: 97.5 F | HEART RATE: 67 BPM | DIASTOLIC BLOOD PRESSURE: 60 MMHG | SYSTOLIC BLOOD PRESSURE: 122 MMHG | WEIGHT: 255.8 LBS | BODY MASS INDEX: 45.32 KG/M2 | HEIGHT: 63 IN

## 2020-10-22 PROCEDURE — 90471 IMMUNIZATION ADMIN: CPT | Performed by: NURSE PRACTITIONER

## 2020-10-22 PROCEDURE — 99213 OFFICE O/P EST LOW 20 MIN: CPT | Performed by: NURSE PRACTITIONER

## 2020-10-22 PROCEDURE — 90686 IIV4 VACC NO PRSV 0.5 ML IM: CPT | Performed by: NURSE PRACTITIONER

## 2020-10-22 RX ORDER — CLINDAMYCIN HYDROCHLORIDE 300 MG/1
300 CAPSULE ORAL 4 TIMES DAILY
Qty: 40 CAPSULE | Refills: 0 | Status: SHIPPED | OUTPATIENT
Start: 2020-10-22 | End: 2020-11-01

## 2020-10-22 RX ORDER — SULFAMETHOXAZOLE AND TRIMETHOPRIM 800; 160 MG/1; MG/1
1 TABLET ORAL 2 TIMES DAILY
Qty: 20 TABLET | Refills: 0 | Status: CANCELLED | OUTPATIENT
Start: 2020-10-22 | End: 2020-11-01

## 2020-10-22 RX ORDER — FLUCONAZOLE 100 MG/1
100 TABLET ORAL DAILY
Qty: 2 TABLET | Refills: 0 | Status: SHIPPED | OUTPATIENT
Start: 2020-10-22 | End: 2020-10-24

## 2020-10-22 ASSESSMENT — ENCOUNTER SYMPTOMS
SHORTNESS OF BREATH: 0
EYE ITCHING: 0
EYE PAIN: 0
EYE DISCHARGE: 0
BLURRED VISION: 0
DOUBLE VISION: 0
WHEEZING: 0
VOMITING: 0
CHEST TIGHTNESS: 0
PHOTOPHOBIA: 0
EYE REDNESS: 0
FOREIGN BODY SENSATION: 0
COUGH: 0
NAUSEA: 0

## 2020-10-22 ASSESSMENT — VISUAL ACUITY: OU: 1

## 2020-10-22 NOTE — PROGRESS NOTES
10/22/2020     Juanita [place] (:  1995) is a 30 y.o. female, here for evaluation of the following medical concerns:    Chief Complaint   Patient presents with    Edema     swollen under right eye     Eye Problem    The right eye is affected. This is a new problem. The current episode started today. The problem occurs constantly. The problem has been gradually worsening. There was no injury mechanism. The pain is at a severity of 5/10. The pain is moderate. There is no known exposure to pink eye. She does not wear contacts. Pertinent negatives include no blurred vision, eye discharge, double vision, eye redness, fever, foreign body sensation, itching, nausea, photophobia, recent URI or vomiting. She has tried water for the symptoms. The treatment provided mild relief. patient states she did have a \"bug bite\" on her right upper lid this morning, however, this is not noticeable at this time. She also admits that approximately five days ago she had bilateral lower lip piercings and the one of the right was draining a slight green discharge. She denies fever. She states she did apply a cool compress and this eased her pain. Patient is concerned if she has to take antibiotics she will get a yeast infection and is requesting Diflucan. Review of Systems   Constitutional: Negative for chills, diaphoresis, fatigue and fever. HENT: Negative. Eyes: Negative for blurred vision, double vision, photophobia, pain, discharge, redness and itching. Pain around eye   Respiratory: Negative for cough, chest tightness, shortness of breath and wheezing. Cardiovascular: Negative for chest pain and palpitations. Gastrointestinal: Negative for nausea and vomiting. Genitourinary: Negative. Musculoskeletal: Negative. Skin:        Swelling to right eye   Neurological: Negative. Psychiatric/Behavioral: Negative. Prior to Visit Medications    Medication Sig Taking?  Authorizing Provider clindamycin (CLEOCIN) 300 MG capsule Take 1 capsule by mouth 4 times daily for 10 days Yes JASMIN Leonardo CNP   fluconazole (DIFLUCAN) 100 MG tablet Take 1 tablet by mouth daily for 2 days Yes JASMIN Leonardo CNP   metFORMIN (GLUCOPHAGE) 500 MG tablet Take 1 tablet by mouth daily (with breakfast) Yes Enedina Bai MD   citalopram (CELEXA) 40 MG tablet TAKE 1 TABLET BY MOUTH EVERY DAY Yes Enedina Bai MD   [address] [address] 0.25-35 MG-MCG per tablet TAKE 1 TABLET BY MOUTH EVERY DAY Yes Chris Ybarra MD   albuterol sulfate HFA (PROAIR HFA) 108 (90 BASE) MCG/ACT inhaler Inhale 2 puffs into the lungs every 6 hours as needed for Wheezing Yes JASMIN Fritz CNP        Social History     Tobacco Use    Smoking status: Never Smoker    Smokeless tobacco: Never Used   Substance Use Topics    Alcohol use: Yes     Comment: social     Drug use: No        Vitals:    10/22/20 1408   BP: 122/60   Pulse: 67   Temp: 97.5 °F (36.4 °C)   SpO2: 98%   Weight: 255 lb 12.8 oz (116 kg)   Height: 5' 3\" (1.6 m)     Estimated body mass index is 45.31 kg/m² as calculated from the following:    Height as of this encounter: 5' 3\" (1.6 m). Weight as of this encounter: 255 lb 12.8 oz (116 kg). Physical Exam  Vitals signs and nursing note reviewed. Constitutional:       General: She is awake. She is not in acute distress. Appearance: Normal appearance. She is well-developed, well-groomed and normal weight. She is not ill-appearing, toxic-appearing or diaphoretic. Eyes:      General: Lids are everted, no foreign bodies appreciated. Vision grossly intact. Gaze aligned appropriately. No allergic shiner, visual field deficit or scleral icterus. Right eye: No foreign body, discharge or hordeolum. Left eye: No foreign body, discharge or hordeolum. Extraocular Movements: Extraocular movements intact.       Conjunctiva/sclera: Conjunctivae normal.      Pupils: Pupils are equal, round, and reactive to light. Cardiovascular:      Rate and Rhythm: Normal rate and regular rhythm. Heart sounds: Normal heart sounds, S1 normal and S2 normal. No murmur. Pulmonary:      Effort: Pulmonary effort is normal.      Breath sounds: Normal breath sounds and air entry. Musculoskeletal:      Right lower leg: No edema. Left lower leg: No edema. Skin:     General: Skin is warm and dry. Capillary Refill: Capillary refill takes less than 2 seconds. Neurological:      General: No focal deficit present. Mental Status: She is alert and oriented to person, place, and time. Psychiatric:         Attention and Perception: Attention normal.         Mood and Affect: Mood normal.         Speech: Speech normal.         Behavior: Behavior normal. Behavior is cooperative. Thought Content: Thought content normal.         Judgment: Judgment normal.     ASSESSMENT/PLAN:  1. Periorbital cellulitis of right eye  Start - clindamycin (CLEOCIN) 300 MG capsule; Take 1 capsule by mouth 4 times daily for 10 days  Dispense: 40 capsule; Refill: 0  If increase in swelling, change in vision, severe headache, fever go to emergency department    2. Acute vaginitis  Only as needed  - fluconazole (DIFLUCAN) 100 MG tablet; Take 1 tablet by mouth daily for 2 days  Dispense: 2 tablet; Refill: 0    3. Need for vaccination  Given today - INFLUENZA, QUADV, 3 YRS AND OLDER, IM PF, PREFILL SYR OR SDV, 0.5ML (AFLURIA QUADV, PF)    Return if symptoms worsen or fail to improve.

## 2020-10-22 NOTE — PATIENT INSTRUCTIONS
Patient Education        Cellulitis of the Eye: Care Instructions  Your Care Instructions     Cellulitis of the eye is an infection of the skin and tissues around the eye. It is also called preseptal cellulitis or periorbital cellulitis. It is usually caused by bacteria. This type of infection may happen after a sinus infection or a dental infection. It could also happen after an insect bite or an injury to the face. It most often occurs where there is a break in the skin. Cellulitis of the eye can be very serious. It's important to treat it right away. If you do, it usually goes away without lasting problems. Medicine and home treatment can help you get better. Follow-up care is a key part of your treatment and safety. Be sure to make and go to all appointments, and call your doctor if you are having problems. It's also a good idea to know your test results and keep a list of the medicines you take. How can you care for yourself at home? · Take your antibiotics as directed. Do not stop taking them just because you feel better. You need to take the full course of antibiotics. · Do not wear contact lenses unless your doctor tells you it is okay. · Put your head on pillows, and put a cool, damp cloth on your eye. This can reduce swelling and pain. · If your doctor recommends it, use a warm pack on your eye. · Keep the skin around your eye clean and dry. · Be safe with medicines. Read and follow all instructions on the label. ? If the doctor gave you a prescription medicine for pain, take it as prescribed. ? If you are not taking a prescription pain medicine, ask your doctor if you can take an over-the-counter medicine. To prevent cellulitis  · Wash your hands well after you use the bathroom and before and after you eat. · Do not rub or pick at the skin around your eyes. Cellulitis occurs most often where there is a break in the skin.   · If you get a cut, pimple, or insect bite near your eye, clean the area as soon as you can. This can help prevent an infection. ? Wash the area with cool water and a mild soap, such as [place]. ? Do not use rubbing alcohol, hydrogen peroxide, iodine, or Mercurochrome. These can harm the tissues and slow healing. · Call your doctor if you have a sinus infection with redness or swelling of your eyes. When should you call for help? Call your doctor now or seek immediate medical care if:    · You have new or worse signs of an eye infection, such as:  ? Pus or thick discharge coming from the eye.  ? Redness or swelling around the eye.  ? A fever.     · Your eye seems to be bulging out.     · You seem to be getting sicker.     · You have vision changes. Watch closely for changes in your health, and be sure to contact your doctor if:    · You do not get better as expected. Where can you learn more? Go to https://eTelemetrypepiceweb.[email or web address]. org and sign in to your [employer] account. Enter [phone] in the [facility] box to learn more about \"Cellulitis of the Eye: Care Instructions. \"     If you do not have an account, please click on the \"Sign Up Now\" link. Current as of: December 18, 2019               Content Version: 12.6  © 6052-2434 [employer]. Care instructions adapted under license by [facility] ([facility]). If you have questions about a medical condition or this instruction, always ask your healthcare professional. [address] [place] [address] any warranty or liability for your use of this information. Patient Education        Influenza (Flu) Vaccine (Inactivated or Recombinant): What You Need to Know  Why get vaccinated? Influenza vaccine can prevent influenza (flu). Flu is a contagious disease that spreads around the [place] every year, usually between October and May. Anyone can get the flu, but it is more dangerous for some people.  Infants and young children, people 72years of age and older, pregnant women, and people with certain health conditions or a weakened immune system are at greatest risk of flu complications. Pneumonia, bronchitis, sinus infections and ear infections are examples of flu-related complications. If you have a medical condition, such as heart disease, cancer or diabetes, flu can make it worse. Flu can cause fever and chills, sore throat, muscle aches, fatigue, cough, headache, and runny or stuffy nose. Some people may have vomiting and diarrhea, though this is more common in children than adults. Each year, thousands of people in the [facility] die from flu, and many more are hospitalized. Flu vaccine prevents millions of illnesses and flu-related visits to the doctor each year. Influenza vaccine  CDC recommends everyone 10months of age and older get vaccinated every flu season. Children 6 months through 6years of age may need 2 doses during a single flu season. Everyone else needs only 1 dose each flu season. It takes about 2 weeks for protection to develop after vaccination. There are many flu viruses, and they are always changing. Each year a new flu vaccine is made to protect against three or four viruses that are likely to cause disease in the upcoming flu season. Even when the vaccine doesn't exactly match these viruses, it may still provide some protection. Influenza vaccine does not cause flu. Influenza vaccine may be given at the same time as other vaccines. Talk with your health care provider  Tell your vaccine provider if the person getting the vaccine:  · Has had an allergic reaction after a previous dose of influenza vaccine, or has any severe, life-threatening allergies. · Has ever had Guillain-Barré Syndrome (also called GBS). In some cases, your health care provider may decide to postpone influenza vaccination to a future visit. People with minor illnesses, such as a cold, may be vaccinated.  People who are moderately or severely ill should usually wait until they recover before getting influenza vaccine. Your health care provider can give you more information. Risks of a vaccine reaction  · Soreness, redness, and swelling where shot is given, fever, muscle aches, and headache can happen after influenza vaccine. · There may be a very small increased risk of Guillain-Barré Syndrome (GBS) after inactivated influenza vaccine (the flu shot). [facility] children who get the flu shot along with pneumococcal vaccine (PCV13), and/or DTaP vaccine at the same time might be slightly more likely to have a seizure caused by fever. Tell your health care provider if a child who is getting flu vaccine has ever had a seizure. People sometimes faint after medical procedures, including vaccination. Tell your provider if you feel dizzy or have vision changes or ringing in the ears. As with any medicine, there is a very remote chance of a vaccine causing a severe allergic reaction, other serious injury, or death. What if there is a serious problem? An allergic reaction could occur after the vaccinated person leaves the clinic. If you see signs of a severe allergic reaction (hives, swelling of the face and throat, difficulty breathing, a fast heartbeat, dizziness, or weakness), call 9-1-1 and get the person to the nearest hospital.  For other signs that concern you, call your health care provider. Adverse reactions should be reported to the Vaccine Adverse Event Reporting System (VAERS). Your health care provider will usually file this report, or you can do it yourself. Visit the VAERS website at www.vaers. hhs.gov or call 2-899.948.6041. VAERS is only for reporting reactions, and VAERS staff do not give medical advice. The National Vaccine Injury Compensation Program  The National Vaccine Injury Compensation Program (VICP) is a federal program that was created to compensate people who may have been injured by certain vaccines.  Visit the VICP website at www.hrsa.gov/vaccinecompensation or call 7-993.644.3373 to learn about the program and about filing a claim. There is a time limit to file a claim for compensation. How can I learn more? · Ask your healthcare provider. · Call your local or state health department. · Contact the Centers for Disease Control and Prevention (CDC):  ? Call 9-736.423.5611 (1-800-CDC-INFO) or  ? Visit CDC's website at www.cdc.gov/flu  Vaccine Information Statement (Interim)  Inactivated Influenza Vaccine  8/15/2019  42 ELLIE Ramsey 124BB-52  Department of Health and Human Services  Centers for Disease Control and Prevention  Many Vaccine Information Statements are available in Amharic and other languages. See www.immunize.org/vis. Muchas hojas de información sobre vacunas están disponibles en español y en otros idiomas. Visite www.immunize.org/vis. Care instructions adapted under license by [facility] ([facility]). If you have questions about a medical condition or this instruction, always ask your healthcare professional. [place] [address] any warranty or liability for your use of this information.

## 2020-12-18 DIAGNOSIS — F32.A DEPRESSION, UNSPECIFIED DEPRESSION TYPE: ICD-10-CM

## 2020-12-18 NOTE — TELEPHONE ENCOUNTER
Medication and Quantity requested: citalopram (CELEXA) 40 MG tablet        Last Visit  10/22/20    Pharmacy and phone number updated in [facility]:  [facility]

## 2020-12-21 RX ORDER — CITALOPRAM 40 MG/1
TABLET ORAL
Qty: 90 TABLET | Refills: 1 | Status: SHIPPED | OUTPATIENT
Start: 2020-12-21 | End: 2021-11-04 | Stop reason: SDUPTHER

## 2020-12-23 NOTE — TELEPHONE ENCOUNTER
lov 10/22/20  lrf 90 0 9/24/20  Lab Results   Component Value Date    LABA1C 5.4 08/17/2017     Lab Results   Component Value Date    .3 08/17/2017

## 2021-09-15 ENCOUNTER — TELEPHONE (OUTPATIENT)
Dept: FAMILY MEDICINE CLINIC | Age: 26
End: 2021-09-15

## 2021-09-15 DIAGNOSIS — Z11.52 ENCOUNTER FOR SCREENING FOR COVID-19: Primary | ICD-10-CM

## 2021-09-15 NOTE — TELEPHONE ENCOUNTER
----- Message from Lazaro Flores sent at 9/15/2021  8:17 AM EDT -----  Subject: Message to Provider    QUESTIONS  Information for Provider? Olga(mom) called in regarding patient having   cold symptoms. Patient would like to schedule an covid test. Patient has   sore throat, running nose, sneezing and coughing.  ---------------------------------------------------------------------------  --------------  CALL BACK INFO  What is the best way for the office to contact you? OK to leave message on   voicemail  Preferred Call Back Phone Number? [phone]  ---------------------------------------------------------------------------  --------------  SCRIPT ANSWERS  Relationship to Patient? Other  Representative Name? Anuradha  Is the Representative on the appropriate HIPAA document in Epic?  Yes

## 2021-09-15 NOTE — TELEPHONE ENCOUNTER
LVM.. She can go to [facility] for this  [phone] that is the number to schedule. . Or she can go to a local pharmacy or urgent care

## 2021-09-16 ENCOUNTER — TELEPHONE (OUTPATIENT)
Dept: FAMILY MEDICINE CLINIC | Age: 26
End: 2021-09-16

## 2021-09-16 NOTE — TELEPHONE ENCOUNTER
Pt has COVID symptoms. Spoke with Reba Oneal (on hipaa) - she said pt wants to be seen in the office, not VV. I explained with these symptoms provider will probably request a VV visit. Call to advise. Pt is asking for suggestions where to be tested for COVID?

## 2021-09-16 NOTE — TELEPHONE ENCOUNTER
----- Message from Montalvo Mallory sent at 9/16/2021  1:07 PM EDT -----  Subject: Appointment Request    Reason for Call: Semi-Routine Cough, Cold Symptoms    QUESTIONS  Type of Appointment? Established Patient  Reason for appointment request? Available appointments did not meet   patient need  Additional Information for Provider? Pt. has many covid symptoms,   including cough, runny nose, sore throat & loss of test. Wants to come in   to be seen and have a covid test. When you call back, you'll speak w/ Pts   mom, Colleen Mack (she is on HIPAA)  ---------------------------------------------------------------------------  --------------  Daryle Haver BAKARI  What is the best way for the office to contact you? OK to leave message on   voicemail  Preferred Call Back Phone Number? [phone]  ---------------------------------------------------------------------------  --------------  SCRIPT ANSWERS  Relationship to Patient? Self  Are you currently unable to finish sentences due to any difficulty   breathing? No  Are you unable to swallow liquids? No  Are you having fevers (100.4 or greater), chills, or sweats? No  Do you have COPD, asthma or a chronic lung condition? No  Have your symptoms been present for more than 5 days? No  Have you recently (14 days) been seen by a provider for this issue? No  Have you been diagnosed with, awaiting test results for, or told that you   are suspected of having COVID-19 (Coronavirus)? (If patient has tested   negative or was tested as a requirement for work, school, or travel and   not based on symptoms, answer no)? No  Within the past two weeks have you developed any of the following symptoms   (answer no if symptoms have been present longer than 2 weeks or began   more than 2 weeks ago)?  Fever or Chills, Cough, Shortness of breath or   difficulty breathing, Loss of taste or smell, Sore throat, Nasal   congestion, Sneezing or runny nose, Fatigue or generalized body aches   (answer no if pain is specific to a body part e.g. back pain), Diarrhea,   Headache?  Yes

## 2021-09-17 NOTE — TELEPHONE ENCOUNTER
Patient's mother called (HIPAA verified) and her daughter tried to take PTO and her employer said she still had to come to work. Spoke to Dr. Sami Devine and he advised her to go to Urgent Care to get tested. She will talk to her daughter about doing a VV on Monday with Dr. Sami Devine.

## 2021-11-04 ENCOUNTER — OFFICE VISIT (OUTPATIENT)
Dept: FAMILY MEDICINE CLINIC | Age: 26
End: 2021-11-04
Payer: COMMERCIAL

## 2021-11-04 VITALS
WEIGHT: 258 LBS | BODY MASS INDEX: 45.71 KG/M2 | HEIGHT: 63 IN | OXYGEN SATURATION: 99 % | DIASTOLIC BLOOD PRESSURE: 68 MMHG | HEART RATE: 61 BPM | SYSTOLIC BLOOD PRESSURE: 118 MMHG

## 2021-11-04 DIAGNOSIS — F32.A DEPRESSION, UNSPECIFIED DEPRESSION TYPE: ICD-10-CM

## 2021-11-04 DIAGNOSIS — E66.01 CLASS 3 SEVERE OBESITY DUE TO EXCESS CALORIES WITHOUT SERIOUS COMORBIDITY WITH BODY MASS INDEX (BMI) OF 45.0 TO 49.9 IN ADULT (HCC): ICD-10-CM

## 2021-11-04 DIAGNOSIS — Z00.00 ANNUAL PHYSICAL EXAM: Primary | ICD-10-CM

## 2021-11-04 DIAGNOSIS — E28.2 POLYCYSTIC OVARIAN DISEASE: ICD-10-CM

## 2021-11-04 DIAGNOSIS — Z30.8 ENCOUNTER FOR OTHER CONTRACEPTIVE MANAGEMENT: ICD-10-CM

## 2021-11-04 LAB
A/G RATIO: 1.6 (ref 1.1–2.2)
ALBUMIN SERPL-MCNC: 4.3 G/DL (ref 3.4–5)
ALP BLD-CCNC: 107 U/L (ref 40–129)
ALT SERPL-CCNC: 31 U/L (ref 10–40)
ANION GAP SERPL CALCULATED.3IONS-SCNC: 17 MMOL/L (ref 3–16)
AST SERPL-CCNC: 25 U/L (ref 15–37)
BASOPHILS ABSOLUTE: 0.1 K/UL (ref 0–0.2)
BASOPHILS RELATIVE PERCENT: 0.8 %
BILIRUB SERPL-MCNC: 0.4 MG/DL (ref 0–1)
BUN BLDV-MCNC: 6 MG/DL (ref 7–20)
CALCIUM SERPL-MCNC: 9.8 MG/DL (ref 8.3–10.6)
CHLORIDE BLD-SCNC: 104 MMOL/L (ref 99–110)
CHOLESTEROL, FASTING: 188 MG/DL (ref 0–199)
CO2: 21 MMOL/L (ref 21–32)
CREAT SERPL-MCNC: 0.6 MG/DL (ref 0.6–1.1)
EOSINOPHILS ABSOLUTE: 0.1 K/UL (ref 0–0.6)
EOSINOPHILS RELATIVE PERCENT: 0.9 %
GFR AFRICAN AMERICAN: >60
GFR NON-AFRICAN AMERICAN: >60
GLUCOSE BLD-MCNC: 76 MG/DL (ref 70–99)
HCT VFR BLD CALC: 39 % (ref 36–48)
HDLC SERPL-MCNC: 59 MG/DL (ref 40–60)
HEMOGLOBIN: 12.9 G/DL (ref 12–16)
LDL CHOLESTEROL CALCULATED: 107 MG/DL
LYMPHOCYTES ABSOLUTE: 2.7 K/UL (ref 1–5.1)
LYMPHOCYTES RELATIVE PERCENT: 32.9 %
MCH RBC QN AUTO: 29.2 PG (ref 26–34)
MCHC RBC AUTO-ENTMCNC: 33 G/DL (ref 31–36)
MCV RBC AUTO: 88.3 FL (ref 80–100)
MONOCYTES ABSOLUTE: 0.4 K/UL (ref 0–1.3)
MONOCYTES RELATIVE PERCENT: 5.5 %
NEUTROPHILS ABSOLUTE: 4.9 K/UL (ref 1.7–7.7)
NEUTROPHILS RELATIVE PERCENT: 59.9 %
PDW BLD-RTO: 14.5 % (ref 12.4–15.4)
PLATELET # BLD: 311 K/UL (ref 135–450)
PMV BLD AUTO: 9.1 FL (ref 5–10.5)
POTASSIUM SERPL-SCNC: 4.6 MMOL/L (ref 3.5–5.1)
RBC # BLD: 4.42 M/UL (ref 4–5.2)
SODIUM BLD-SCNC: 142 MMOL/L (ref 136–145)
T4 FREE: 1.2 NG/DL (ref 0.9–1.8)
TOTAL PROTEIN: 7 G/DL (ref 6.4–8.2)
TRIGLYCERIDE, FASTING: 112 MG/DL (ref 0–150)
TSH REFLEX: 1.53 UIU/ML (ref 0.27–4.2)
VLDLC SERPL CALC-MCNC: 22 MG/DL
WBC # BLD: 8.1 K/UL (ref 4–11)

## 2021-11-04 PROCEDURE — 99214 OFFICE O/P EST MOD 30 MIN: CPT | Performed by: NURSE PRACTITIONER

## 2021-11-04 RX ORDER — DROSPIRENONE AND ETHINYL ESTRADIOL 0.02-3(28)
1 KIT ORAL DAILY
Qty: 28 TABLET | Refills: 11 | Status: SHIPPED | OUTPATIENT
Start: 2021-11-04 | End: 2022-02-06 | Stop reason: SDUPTHER

## 2021-11-04 RX ORDER — TRAZODONE HYDROCHLORIDE 50 MG/1
50 TABLET ORAL NIGHTLY
COMMUNITY
End: 2021-11-04

## 2021-11-04 RX ORDER — BUPROPION HYDROCHLORIDE 150 MG/1
150 TABLET ORAL EVERY MORNING
Qty: 90 TABLET | Refills: 3 | Status: SHIPPED | OUTPATIENT
Start: 2021-11-04 | End: 2022-06-22 | Stop reason: SDUPTHER

## 2021-11-04 RX ORDER — CITALOPRAM 40 MG/1
TABLET ORAL
Qty: 90 TABLET | Refills: 3 | Status: SHIPPED | OUTPATIENT
Start: 2021-11-04 | End: 2022-01-03

## 2021-11-04 SDOH — ECONOMIC STABILITY: FOOD INSECURITY: WITHIN THE PAST 12 MONTHS, THE FOOD YOU BOUGHT JUST DIDN'T LAST AND YOU DIDN'T HAVE MONEY TO GET MORE.: OFTEN TRUE

## 2021-11-04 SDOH — ECONOMIC STABILITY: FOOD INSECURITY: WITHIN THE PAST 12 MONTHS, YOU WORRIED THAT YOUR FOOD WOULD RUN OUT BEFORE YOU GOT MONEY TO BUY MORE.: SOMETIMES TRUE

## 2021-11-04 ASSESSMENT — ENCOUNTER SYMPTOMS
BLOOD IN STOOL: 0
SHORTNESS OF BREATH: 0
WHEEZING: 0
VOMITING: 0
SORE THROAT: 0
ABDOMINAL PAIN: 0
CHEST TIGHTNESS: 0
DIARRHEA: 0
CONSTIPATION: 0
EYES NEGATIVE: 1
NAUSEA: 0
SINUS PAIN: 0
SINUS PRESSURE: 0
COUGH: 0

## 2021-11-04 ASSESSMENT — PATIENT HEALTH QUESTIONNAIRE - PHQ9
SUM OF ALL RESPONSES TO PHQ QUESTIONS 1-9: 19
5. POOR APPETITE OR OVEREATING: 3
9. THOUGHTS THAT YOU WOULD BE BETTER OFF DEAD, OR OF HURTING YOURSELF: 1
8. MOVING OR SPEAKING SO SLOWLY THAT OTHER PEOPLE COULD HAVE NOTICED. OR THE OPPOSITE, BEING SO FIGETY OR RESTLESS THAT YOU HAVE BEEN MOVING AROUND A LOT MORE THAN USUAL: 0
SUM OF ALL RESPONSES TO PHQ QUESTIONS 1-9: 19
3. TROUBLE FALLING OR STAYING ASLEEP: 3
SUM OF ALL RESPONSES TO PHQ QUESTIONS 1-9: 18
SUM OF ALL RESPONSES TO PHQ9 QUESTIONS 1 & 2: 4
10. IF YOU CHECKED OFF ANY PROBLEMS, HOW DIFFICULT HAVE THESE PROBLEMS MADE IT FOR YOU TO DO YOUR WORK, TAKE CARE OF THINGS AT HOME, OR GET ALONG WITH OTHER PEOPLE: 1
2. FEELING DOWN, DEPRESSED OR HOPELESS: 2
6. FEELING BAD ABOUT YOURSELF - OR THAT YOU ARE A FAILURE OR HAVE LET YOURSELF OR YOUR FAMILY DOWN: 3
4. FEELING TIRED OR HAVING LITTLE ENERGY: 3
1. LITTLE INTEREST OR PLEASURE IN DOING THINGS: 2
7. TROUBLE CONCENTRATING ON THINGS, SUCH AS READING THE NEWSPAPER OR WATCHING TELEVISION: 2

## 2021-11-04 ASSESSMENT — COLUMBIA-SUICIDE SEVERITY RATING SCALE - C-SSRS
7. DID THIS OCCUR IN THE LAST THREE MONTHS: NO
1. WITHIN THE PAST MONTH, HAVE YOU WISHED YOU WERE DEAD OR WISHED YOU COULD GO TO SLEEP AND NOT WAKE UP?: YES
6. HAVE YOU EVER DONE ANYTHING, STARTED TO DO ANYTHING, OR PREPARED TO DO ANYTHING TO END YOUR LIFE?: YES
2. HAVE YOU ACTUALLY HAD ANY THOUGHTS OF KILLING YOURSELF?: NO

## 2021-11-04 ASSESSMENT — SOCIAL DETERMINANTS OF HEALTH (SDOH): HOW HARD IS IT FOR YOU TO PAY FOR THE VERY BASICS LIKE FOOD, HOUSING, MEDICAL CARE, AND HEATING?: VERY HARD

## 2021-11-04 NOTE — PROGRESS NOTES
2021     Mani Langston (:  1995) is a 27 y.o. female, here for evaluation of the following medical concerns:    Chief Complaint   Patient presents with    Annual Exam    Contraception     refill and maybe a different one     Obesity:  Has started working out three days per week for the last two months. She was using a  and this has helped her stay motivated. PCOS:  Patient states she would like to change the type of birth control she is taking. She would like to change to JAI as she heard this may help her lose weight. She continues to Metformin daily. She is not currently following the diet for PCOS as she feels it is too restrictive. Depression:  She feels she has been taking Celexa for several years and feels she would like to add something new. Says that she has had a lot of change at home. Mom was recently diagnosed with CHF and placed into a nursing home. Denies suicidal/homicidal ideation. Review of Systems   Constitutional: Negative for chills, diaphoresis, fatigue and fever. HENT: Negative for congestion, ear discharge, ear pain, sinus pressure, sinus pain and sore throat. Eyes: Negative. Respiratory: Negative for cough, chest tightness, shortness of breath and wheezing. Cardiovascular: Negative for chest pain, palpitations and leg swelling. Gastrointestinal: Negative for abdominal pain, blood in stool, constipation, diarrhea, nausea and vomiting. Endocrine: Negative. Genitourinary: Negative. Musculoskeletal: Negative. Skin: Negative. Neurological: Negative for dizziness, weakness and headaches. Psychiatric/Behavioral: Negative. Prior to Visit Medications    Medication Sig Taking?  Authorizing Provider   drospirenone-ethinyl estradiol (JAI) 3-0.02 MG per tablet Take 1 tablet by mouth daily Yes JASMIN Burton - CNP   metFORMIN (GLUCOPHAGE) 500 MG tablet TAKE 1 TABLET BY MOUTH EVERY DAY WITH BREAKFAST Yes JASMIN Burton - CNP   citalopram (CELEXA) 40 MG tablet TAKE 1 TABLET BY MOUTH EVERY DAY Yes JASMIN Joshi - CNP   buPROPion (WELLBUTRIN XL) 150 MG extended release tablet Take 1 tablet by mouth every morning Yes Rory MayJASMIN - RADHA        Social History     Tobacco Use    Smoking status: Never Smoker    Smokeless tobacco: Never Used   Substance Use Topics    Alcohol use: Yes     Comment: social     Drug use: No        Vitals:    11/04/21 1040   BP: 118/68   Pulse: 61   SpO2: 99%   Weight: 258 lb (117 kg)   Height: 5' 3\" (1.6 m)     Estimated body mass index is 45.7 kg/m² as calculated from the following:    Height as of this encounter: 5' 3\" (1.6 m). Weight as of this encounter: 258 lb (117 kg). Physical Exam  Vitals and nursing note reviewed. Constitutional:       General: She is awake. She is not in acute distress. Appearance: Normal appearance. She is well-developed and well-groomed. She is obese. She is not ill-appearing, toxic-appearing or diaphoretic. HENT:      Head: Normocephalic and atraumatic. Right Ear: Tympanic membrane, ear canal and external ear normal.      Left Ear: Tympanic membrane, ear canal and external ear normal.      Nose: Nose normal.      Mouth/Throat:      Lips: Pink. Mouth: Mucous membranes are moist.      Pharynx: Oropharynx is clear. Eyes:      Extraocular Movements: Extraocular movements intact. Conjunctiva/sclera: Conjunctivae normal.      Pupils: Pupils are equal, round, and reactive to light. Neck:      Thyroid: No thyroid mass, thyromegaly or thyroid tenderness. Vascular: No carotid bruit or JVD. Cardiovascular:      Rate and Rhythm: Normal rate and regular rhythm. Heart sounds: Normal heart sounds, S1 normal and S2 normal. No murmur heard. Pulmonary:      Effort: Pulmonary effort is normal.      Breath sounds: Normal breath sounds and air entry. Abdominal:      General: Abdomen is flat.  Bowel sounds are normal.      Palpations: Abdomen is soft. Musculoskeletal:         General: Normal range of motion. Cervical back: Normal range of motion and neck supple. Right lower leg: No edema. Left lower leg: No edema. Lymphadenopathy:      Head:      Right side of head: No submental, submandibular, tonsillar, preauricular or posterior auricular adenopathy. Left side of head: No submental, submandibular, tonsillar, preauricular or posterior auricular adenopathy. Cervical: No cervical adenopathy. Upper Body:      Right upper body: No supraclavicular adenopathy. Left upper body: No supraclavicular adenopathy. Skin:     General: Skin is warm and dry. Capillary Refill: Capillary refill takes less than 2 seconds. Neurological:      General: No focal deficit present. Mental Status: She is alert and oriented to person, place, and time. GCS: GCS eye subscore is 4. GCS verbal subscore is 5. GCS motor subscore is 6. Psychiatric:         Attention and Perception: Attention normal.         Mood and Affect: Mood normal.         Speech: Speech normal.         Behavior: Behavior normal. Behavior is cooperative. Thought Content: Thought content normal.         Judgment: Judgment normal.         ASSESSMENT/PLAN:  1. Annual physical exam    2. Class 3 severe obesity due to excess calories without serious comorbidity with body mass index (BMI) of 45.0 to 49.9 in [facility])  Strongly recommend eliminating concentrated sweets, reducing simple carbs. Avoid corn syrup and hydrogenated oils. Focus on fruits, vegs, whole grains, lean meats and push water. Fish oil, high fiber foods recommended. Recommended at least 30 minutes of aerobic exercise daily. - Comprehensive Metabolic Panel; Future  - CBC Auto Differential; Future  - Lipid, Fasting; Future  - TSH with Reflex; Future  - T4, Free; Future  - Hemoglobin A1C; Future    3.  Encounter for other contraceptive management  Patient would like to change to Jai as she believes it may help her lose weight. - drospirenone-ethinyl estradiol (JAI) 3-0.02 MG per tablet; Take 1 tablet by mouth daily  Dispense: 28 tablet; Refill: 11    4. Polycystic ovarian disease  Stable  - External Referral To Gynecology - Dr. Allen Points  - metFORMIN (GLUCOPHAGE) 500 MG tablet; TAKE 1 TABLET BY MOUTH EVERY DAY WITH BREAKFAST  Dispense: 90 tablet; Refill: 3    5. Depression, unspecified depression type  Unstable, denies suicidal/homicidal ideations  Adding in Wellbutrin XL, continue Celexa  - citalopram (CELEXA) 40 MG tablet; TAKE 1 TABLET BY MOUTH EVERY DAY  Dispense: 90 tablet; Refill: 3  - buPROPion (WELLBUTRIN XL) 150 MG extended release tablet; Take 1 tablet by mouth every morning  Dispense: 90 tablet; Refill: 3    Return in about 4 weeks (around 12/2/2021), or if symptoms worsen or fail to improve, for depression.

## 2021-11-04 NOTE — PATIENT INSTRUCTIONS
Dr. Persaud       Patient Education        Polycystic Ovary Syndrome: Care Instructions  Overview  Polycystic ovary syndrome (PCOS) is a hormone imbalance that can affect ovulation. It can cause problems with your periods and make it hard to get pregnant. Doctors don't know for sure what causes PCOS, but it seems to run in families. It also seems to be linked to obesity and a risk for diabetes. You may have other symptoms. These include weight gain, acne, hair growth on the face or body, high blood pressure, and high blood sugar. Your ovaries may have cysts on them. These cysts are growths filled with fluid. Keep in mind that even though you may not have regular periods, you can still get pregnant. Talk to your doctor about birth control if you don't want to get pregnant. Sometimes the hormone changes with PCOS can also make it hard to get pregnant. If this is a concern, talk to your doctor about treatment for this problem. With PCOS, you may go for months or longer with no period. Your doctor may recommend medicines that can help regulate your cycle. Follow-up care is a key part of your treatment and safety. Be sure to make and go to all appointments, and call your doctor if you are having problems. It's also a good idea to know your test results and keep a list of the medicines you take. How can you care for yourself at home? · Take your medicines exactly as prescribed. Call your doctor if you think you're having a problem with your medicine. · Eat a healthy diet. Include vegetables, fruits, beans, and whole grains in your diet each day. · If you're overweight, talk to your doctor about safe ways to lose weight. Losing weight can help with many of the symptoms of PCOS. · Get at least 30 minutes of exercise on most days of the week. Walking is a good choice. Or you can run, swim, cycle, or play team sports.   · If you have symptoms that bother you, such as acne and excess hair growth, talk to your doctor about treatment options. Medicines can help. For unwanted hair growth, some prefer to use home treatments. These can include shaving, waxing, or other methods to remove the hair. · If you're feeling sad or depressed, consider talking to a counselor or to others who have PCOS. It may help. When should you call for help? Call your doctor now or seek immediate medical care if:    · You have severe vaginal bleeding.     · You have new or worse belly or pelvic pain. Watch closely for changes in your health, and be sure to contact your doctor if:    · You do not get better as expected.     · You have unusual vaginal bleeding. Where can you learn more? Go to [email or web address] and sign in to your "[employer]" account. Enter V209 in the [employer] box to learn more about \"Polycystic Ovary Syndrome: Care Instructions. \"     If you do not have an account, please click on the \"Sign Up Now\" link. Current as of: February 11, 2021               Content Version: 13.0  © 2006-2021 [employer]wise, Incorporated. Care instructions adapted under license by [facility] ([facility]). If you have questions about a medical condition or this instruction, always ask your healthcare professional. [address] any warranty or liability for your use of this information.

## 2021-11-05 LAB
ESTIMATED AVERAGE GLUCOSE: 99.7 MG/DL
HBA1C MFR BLD: 5.1 %

## 2021-11-15 DIAGNOSIS — K21.9 GASTROESOPHAGEAL REFLUX DISEASE WITHOUT ESOPHAGITIS: Primary | ICD-10-CM

## 2021-11-15 RX ORDER — PANTOPRAZOLE SODIUM 40 MG/1
40 TABLET, DELAYED RELEASE ORAL
Qty: 90 TABLET | Refills: 1 | Status: SHIPPED | OUTPATIENT
Start: 2021-11-15 | End: 2022-06-22

## 2021-12-30 DIAGNOSIS — F32.A DEPRESSION, UNSPECIFIED DEPRESSION TYPE: ICD-10-CM

## 2022-01-03 RX ORDER — CITALOPRAM 40 MG/1
TABLET ORAL
Qty: 90 TABLET | Refills: 0 | Status: SHIPPED | OUTPATIENT
Start: 2022-01-03 | End: 2022-03-11

## 2022-02-06 DIAGNOSIS — Z30.8 ENCOUNTER FOR OTHER CONTRACEPTIVE MANAGEMENT: ICD-10-CM

## 2022-02-07 RX ORDER — DROSPIRENONE AND ETHINYL ESTRADIOL 0.02-3(28)
1 KIT ORAL DAILY
Qty: 28 TABLET | Refills: 0 | Status: SHIPPED | OUTPATIENT
Start: 2022-02-07 | End: 2022-04-07

## 2022-02-07 NOTE — TELEPHONE ENCOUNTER
Medication:   Requested Prescriptions     Pending Prescriptions Disp Refills    drospirenone-ethinyl estradiol (JAI) 3-0.02 MG per tablet 28 tablet 11     Sig: Take 1 tablet by mouth daily        Last Filled:  1/3/2022    Patient Phone Number: [phone] (home) [phone] (work)    Last appt: 11/4/2021   Next appt: 2/6/2022    Last OARRS: No flowsheet data found.

## 2022-03-11 DIAGNOSIS — F32.A DEPRESSION, UNSPECIFIED DEPRESSION TYPE: ICD-10-CM

## 2022-03-11 RX ORDER — CITALOPRAM 40 MG/1
TABLET ORAL
Qty: 90 TABLET | Refills: 0 | Status: SHIPPED | OUTPATIENT
Start: 2022-03-11 | End: 2022-06-22 | Stop reason: SDUPTHER

## 2022-03-11 NOTE — TELEPHONE ENCOUNTER
Medication:   Requested Prescriptions     Pending Prescriptions Disp Refills    citalopram (CELEXA) 40 MG tablet [Pharmacy Med Name: Citalopram Hydrobromide 40 MG Oral Tablet] 90 tablet 0     Sig: Take 1 tablet by mouth once daily        Last Filled:  1/3/2022    Patient Phone Number: [phone] (home) [phone] (work)    Last appt: 11/4/2021   Next appt: Visit date not found    Last OARRS: No flowsheet data found.

## 2022-04-07 DIAGNOSIS — Z30.8 ENCOUNTER FOR OTHER CONTRACEPTIVE MANAGEMENT: ICD-10-CM

## 2022-04-07 RX ORDER — DROSPIRENONE AND ETHINYL ESTRADIOL 0.02-3(28)
1 KIT ORAL DAILY
Qty: 84 TABLET | Refills: 1 | Status: SHIPPED | OUTPATIENT
Start: 2022-04-07 | End: 2022-06-23 | Stop reason: SDUPTHER

## 2022-04-07 NOTE — TELEPHONE ENCOUNTER
Medication:   Requested Prescriptions     Pending Prescriptions Disp Refills    drospirenone-ethinyl estradiol (JAI) 3-0.02 MG per tablet [Pharmacy Med Name: Surya BLACKMAN 3/0.02MG T 28] 28 tablet 0     Sig: TAKE 1 TABLET BY MOUTH DAILY        Last Filled:  02/07/2022 #28 0rf    Patient Phone Number: [phone] (home) [phone] (work)    Last appt: 11/4/2021   Next appt: Visit date not found    Last OARRS: No flowsheet data found.

## 2022-06-22 ENCOUNTER — OFFICE VISIT (OUTPATIENT)
Dept: FAMILY MEDICINE CLINIC | Age: 27
End: 2022-06-22
Payer: COMMERCIAL

## 2022-06-22 VITALS
HEIGHT: 63 IN | WEIGHT: 272 LBS | OXYGEN SATURATION: 99 % | HEART RATE: 71 BPM | DIASTOLIC BLOOD PRESSURE: 64 MMHG | SYSTOLIC BLOOD PRESSURE: 110 MMHG | BODY MASS INDEX: 48.2 KG/M2

## 2022-06-22 DIAGNOSIS — L02.91 ABSCESS: ICD-10-CM

## 2022-06-22 DIAGNOSIS — Z30.8 ENCOUNTER FOR OTHER CONTRACEPTIVE MANAGEMENT: ICD-10-CM

## 2022-06-22 DIAGNOSIS — E28.2 PCOS (POLYCYSTIC OVARIAN SYNDROME): ICD-10-CM

## 2022-06-22 DIAGNOSIS — F32.A DEPRESSION, UNSPECIFIED DEPRESSION TYPE: Primary | ICD-10-CM

## 2022-06-22 DIAGNOSIS — Z30.011 ENCOUNTER FOR INITIAL PRESCRIPTION OF CONTRACEPTIVE PILLS: ICD-10-CM

## 2022-06-22 LAB — GONADOTROPIN, CHORIONIC (HCG) QUANT: <5 MIU/ML

## 2022-06-22 PROCEDURE — 99214 OFFICE O/P EST MOD 30 MIN: CPT | Performed by: NURSE PRACTITIONER

## 2022-06-22 RX ORDER — SULFAMETHOXAZOLE AND TRIMETHOPRIM 800; 160 MG/1; MG/1
1 TABLET ORAL 2 TIMES DAILY
Qty: 20 TABLET | Refills: 0 | Status: SHIPPED | OUTPATIENT
Start: 2022-06-22 | End: 2022-07-02

## 2022-06-22 RX ORDER — CEPHALEXIN 500 MG/1
500 CAPSULE ORAL 3 TIMES DAILY
Qty: 30 CAPSULE | Refills: 0 | Status: SHIPPED | OUTPATIENT
Start: 2022-06-22 | End: 2022-07-02

## 2022-06-22 RX ORDER — FLUCONAZOLE 100 MG/1
200 TABLET ORAL DAILY
Qty: 2 TABLET | Refills: 0 | Status: SHIPPED | OUTPATIENT
Start: 2022-06-22 | End: 2022-06-23

## 2022-06-22 RX ORDER — METFORMIN HYDROCHLORIDE 500 MG/1
500 TABLET, EXTENDED RELEASE ORAL
Qty: 90 TABLET | Refills: 1 | Status: SHIPPED | OUTPATIENT
Start: 2022-06-22

## 2022-06-22 RX ORDER — BUPROPION HYDROCHLORIDE 150 MG/1
150 TABLET ORAL EVERY MORNING
Qty: 90 TABLET | Refills: 1 | Status: SHIPPED | OUTPATIENT
Start: 2022-06-22

## 2022-06-22 RX ORDER — CITALOPRAM 40 MG/1
40 TABLET ORAL DAILY
Qty: 90 TABLET | Refills: 1 | Status: SHIPPED | OUTPATIENT
Start: 2022-06-22

## 2022-06-22 ASSESSMENT — PATIENT HEALTH QUESTIONNAIRE - PHQ9
10. IF YOU CHECKED OFF ANY PROBLEMS, HOW DIFFICULT HAVE THESE PROBLEMS MADE IT FOR YOU TO DO YOUR WORK, TAKE CARE OF THINGS AT HOME, OR GET ALONG WITH OTHER PEOPLE: 1
SUM OF ALL RESPONSES TO PHQ QUESTIONS 1-9: 12
8. MOVING OR SPEAKING SO SLOWLY THAT OTHER PEOPLE COULD HAVE NOTICED. OR THE OPPOSITE, BEING SO FIGETY OR RESTLESS THAT YOU HAVE BEEN MOVING AROUND A LOT MORE THAN USUAL: 0
SUM OF ALL RESPONSES TO PHQ QUESTIONS 1-9: 12
9. THOUGHTS THAT YOU WOULD BE BETTER OFF DEAD, OR OF HURTING YOURSELF: 0
6. FEELING BAD ABOUT YOURSELF - OR THAT YOU ARE A FAILURE OR HAVE LET YOURSELF OR YOUR FAMILY DOWN: 2
SUM OF ALL RESPONSES TO PHQ QUESTIONS 1-9: 12
1. LITTLE INTEREST OR PLEASURE IN DOING THINGS: 2
7. TROUBLE CONCENTRATING ON THINGS, SUCH AS READING THE NEWSPAPER OR WATCHING TELEVISION: 1
SUM OF ALL RESPONSES TO PHQ9 QUESTIONS 1 & 2: 3
2. FEELING DOWN, DEPRESSED OR HOPELESS: 1
SUM OF ALL RESPONSES TO PHQ QUESTIONS 1-9: 12
3. TROUBLE FALLING OR STAYING ASLEEP: 2
4. FEELING TIRED OR HAVING LITTLE ENERGY: 2
5. POOR APPETITE OR OVEREATING: 2

## 2022-06-22 ASSESSMENT — ENCOUNTER SYMPTOMS
RESPIRATORY NEGATIVE: 1
DIARRHEA: 1

## 2022-06-22 NOTE — PROGRESS NOTES
2022     Reinaldo He (:  1995) is a 27 y.o. female, here for evaluation of the following medical concerns:    Chief Complaint   Patient presents with    Medication Refill     Depression:  Patient has been only taking Wellbutrin and Celexa intermittently. She feels she is still depressed. Denies side effects from medication. She denies suicidal ideations. Birth Control:  She ran out of her [place] Jody and would like to start taking this again. She is sexually active and does not want to get pregnant. Rash:  Has multiple small cyst/abscesses on bilateral forearms and in groin. She admits these are something she picks at often. She states she has a few larger areas in her groin that have continued to get bigger. She sometimes has drainage from the areas with either clear or yellow fluid. Denies fevers or pain. She is currently working at [employer], 3rd shift. PCOS:  Has been taking her Metformin daily however it is causing severe diarrhea and bloating. Wt Readings from Last 3 Encounters:   22 272 lb (123.4 kg)   21 258 lb (117 kg)   10/22/20 255 lb 12.8 oz (116 kg)     Review of Systems   Constitutional: Negative. Negative for fever. HENT: Negative. Respiratory: Negative. Cardiovascular: Negative. Gastrointestinal: Positive for diarrhea. Skin: Positive for rash. Neurological: Negative. Psychiatric/Behavioral: Positive for dysphoric mood. Negative for self-injury, sleep disturbance and suicidal ideas. The patient is not nervous/anxious. Prior to Visit Medications    Medication Sig Taking?  Authorizing Provider   citalopram (CELEXA) 40 MG tablet Take 1 tablet by mouth daily Yes JASMIN Naidu CNP   buPROPion (WELLBUTRIN XL) 150 MG extended release tablet Take 1 tablet by mouth every morning Yes JASMIN Naidu CNP   metFORMIN (GLUCOPHAGE-XR) 500 MG extended release tablet Take 1 tablet by mouth daily (with breakfast) Yes Kashif Orellana Tyra Cristina, APRN - CNP   cephALEXin (KEFLEX) 500 MG capsule Take 1 capsule by mouth 3 times daily for 10 days Yes JASMIN Whyte - CNP   sulfamethoxazole-trimethoprim (BACTRIM DS;SEPTRA DS) 800-160 MG per tablet Take 1 tablet by mouth 2 times daily for 10 days Yes JASMIN Whyte - CNP   fluconazole (DIFLUCAN) 100 MG tablet Take 2 tablets by mouth daily for 1 day Yes JASMIN Whyte - CNP   drospirenone-ethinyl estradiol (JAI) 3-0.02 MG per tablet TAKE 1 TABLET BY MOUTH DAILY Yes JASMIN Whyte - CNP        Social History     Tobacco Use    Smoking status: Never Smoker    Smokeless tobacco: Never Used   Substance Use Topics    Alcohol use: Yes     Comment: social     Drug use: No        Vitals:    06/22/22 0944   BP: 110/64   Pulse: 71   SpO2: 99%   Weight: 272 lb (123.4 kg)   Height: 5' 3\" (1.6 m)     Estimated body mass index is 48.18 kg/m² as calculated from the following:    Height as of this encounter: 5' 3\" (1.6 m). Weight as of this encounter: 272 lb (123.4 kg). Physical Exam  Vitals and nursing note reviewed. Constitutional:       General: She is not in acute distress. Appearance: Normal appearance. She is normal weight. She is not ill-appearing. Cardiovascular:      Rate and Rhythm: Normal rate and regular rhythm. Heart sounds: Normal heart sounds, S1 normal and S2 normal. No murmur heard. Pulmonary:      Effort: Pulmonary effort is normal.      Breath sounds: Normal breath sounds and air entry. Abdominal:      General: Abdomen is flat. Bowel sounds are normal. There is no distension. Palpations: Abdomen is soft. There is no mass. Tenderness: There is no abdominal tenderness. There is no guarding or rebound. Hernia: No hernia is present. Skin:     General: Skin is warm and dry. Capillary Refill: Capillary refill takes less than 2 seconds. Findings: Rash present. Comments: Multiple scars noted on bilateral forearms. No abscesses noted. Patient is unable to allow provider to see her groin area as she states \"I have not showered today\". Neurological:      General: No focal deficit present. Mental Status: She is alert. Psychiatric:         Mood and Affect: Mood normal.         ASSESSMENT/PLAN:  1. Depression, unspecified depression type  Unstable, likely due to patients noncompliance  Denies suicidal/homicidal ideations  Encouraged compliance with taking medications daily  - citalopram (CELEXA) 40 MG tablet; Take 1 tablet by mouth daily  Dispense: 90 tablet; Refill: 1  - buPROPion (WELLBUTRIN XL) 150 MG extended release tablet; Take 1 tablet by mouth every morning  Dispense: 90 tablet; Refill: 1    2. Encounter for other contraceptive management  - HCG, QUANTITATIVE, PREGNANCY; Future  If negative, will send in refill of Celsa    3. Abscess  Warm sitz baths 3-4 times per day  Avoid picking at the area  Likely Keratosis Pilaris and infected follicles in groin. Will treat with ABX, if no improvement will refer to derm  Can also use OTC hydrocortisone PRN to arm area  - cephALEXin (KEFLEX) 500 MG capsule; Take 1 capsule by mouth 3 times daily for 10 days  Dispense: 30 capsule; Refill: 0  - sulfamethoxazole-trimethoprim (BACTRIM DS;SEPTRA DS) 800-160 MG per tablet; Take 1 tablet by mouth 2 times daily for 10 days  Dispense: 20 tablet; Refill: 0  - fluconazole (DIFLUCAN) 100 MG tablet; Take 2 tablets by mouth daily for 1 day  Dispense: 2 tablet; Refill: 0    4. PCOS (polycystic ovarian syndrome)  Changed from regular Metformin to XR.  - metFORMIN (GLUCOPHAGE-XR) 500 MG extended release tablet; Take 1 tablet by mouth daily (with breakfast)  Dispense: 90 tablet; Refill: 1    Return in about 3 months (around 9/22/2022) for depression.

## 2022-06-22 NOTE — PATIENT INSTRUCTIONS
Patient Education        Combination Birth Control Pills: Care Instructions  Overview     Combination birth control pills are used to prevent pregnancy. They give you aregular dose of the hormones estrogen and progestin. You take a pill every day to prevent pregnancy. Birth control pills come in packs. The most common type has 3 weeks of hormone pills. Some packs have sugar pills (they do not contain any hormones) for the fourth week. During that fourth no-hormone week, you have your period. Afterthe fourth week (28 days), you start a new pack. Some birth control pills are packaged in different ways. For example, some have hormone pills for the fourth week instead of sugar pills. This is called continuous use. Taking hormones for the entire month causes you to not have periods or to have fewer periods. Others are packaged so that you have a periodevery 3 months. Your doctor will tell you what type of pills you have. Follow-up care is a key part of your treatment and safety. Be sure to make and go to all appointments, and call your doctor if you are having problems. It's also a good idea to know your test results and keep alist of the medicines you take. How can you care for yourself at home? How do you take the pill?  Follow your doctor's instructions about when to start taking your pills. Use backup birth control, such as a condom, or don't have intercourse for 7 days after you start your pills.  Take your pills every day, at about the same time of day. To help yourself do this, try to take them when you do something else every day, such as brushing your teeth.  You can use the pill continuously and skip your period. When you get to the week that you take hormone-free pills, skip those pills and instead start right away on your next pill pack. Continue to take your pill every day. Talk to your doctor if you have any questions. What if you forget to take a pill?   Always read the label for specific instructions, or call your doctor. Here aresome basic guidelines:   If you miss 1 hormone pill, take it as soon as you remember. Ask your doctor if you may need to use a backup birth control method, such as a condom, or not have intercourse.  If you miss 2 or more hormone pills, take one as soon as you remember you forgot them. Then read the pill label or call your doctor about instructions on how to take your missed pills. Use a backup method of birth control or don't have intercourse for 7 days. Pregnancy is more likely if you miss more than 1 pill.  If you had intercourse, you can use emergency contraception to help prevent pregnancy. The most effective emergency contraception is an IUD (inserted by a doctor). You can also get emergency contraceptive pills. You can get them with a prescription from your doctor or without a prescription at most drugstores. What else do you need to know?  The pill can have side effects. ? You may have very light or skipped periods. ? You may have bleeding between periods (spotting). This usually decreases after 3 to 4 months. If you're using the pill continuously, you won't have periods. But you may still have breakthrough bleeding. Talk to your doctor if you have problems with breakthrough bleeding. Even if you have this bleeding, the pill should still work well.  ? You may have mood changes, less interest in sex, or weight gain.  The pill may reduce acne, heavy bleeding and cramping, and symptoms of premenstrual syndrome.  Check with your doctor before you use any other medicines, including over-the-counter medicines, vitamins, herbal products, and supplements. Birth control hormones may not work as well to prevent pregnancy when combined with other medicines.  The pill doesn't protect against sexually transmitted infections (STIs), such as herpes or HIV/AIDS.  If you're not sure whether your sex partner(s) might have an STI, use a condom to help protect against fluid.  Keep in mind that even though you may not have regular periods, you can still get pregnant. Talk to your doctor about birth control if you don't want to get pregnant. Sometimes the hormone changes with PCOS can also make it hard to get pregnant. If this is a concern, talk to your doctor about treatment for thisproblem. With PCOS, you may go for months or longer with no period. Your doctor mayrecommend medicines that can help regulate your cycle. Follow-up care is a key part of your treatment and safety. Be sure to make and go to all appointments, and call your doctor if you are having problems. It's also a good idea to know your test results and keep alist of the medicines you take. How can you care for yourself at home?  Take your medicines exactly as prescribed. Call your doctor if you think you're having a problem with your medicine.  Eat a healthy diet. Include vegetables, fruits, beans, and whole grains in your diet each day.  If you're overweight, talk to your doctor about safe ways to lose weight. Losing weight can help with many of the symptoms of PCOS.  Get at least 30 minutes of exercise on most days of the week. Walking is a good choice. Or you can run, swim, cycle, or play team sports.  If you have symptoms that bother you, such as acne and excess hair growth, talk to your doctor about treatment options. Medicines can help. For unwanted hair growth, some prefer to use home treatments. These can include shaving, waxing, or other methods to remove the hair.  If you're feeling sad or depressed, consider talking to a counselor or to others who have PCOS. It may help. When should you call for help? Call your doctor now or seek immediate medical care if:     You have severe vaginal bleeding.      You have new or worse belly or pelvic pain.    Watch closely for changes in your health, and be sure to contact your doctor if:     You do not get better as expected.      You have unusual vaginal bleeding. Where can you learn more? Go to https://chpepiceweb.[email or web address] org and sign in to your [employer] account. Enter U003 in the Located within [facility] box to learn more about \"Polycystic Ovary Syndrome: Care Instructions. \"     If you do not have an account, please click on the \"Sign Up Now\" link. Current as of: November 22, 2021               Content Version: 13.3  © 2006-2022 [employer]. Care instructions adapted under license by [facility] ([facility]). If you have questions about a medical condition or this instruction, always ask your healthcare professional. [place] [address] any warranty or liability for your use of this information. Patient Education        Depression and Chronic Disease: Care Instructions  Your Care Instructions     A chronic disease is one that you have for a long time. Some chronic diseases can be controlled, but they usually cannot be cured. Depression is common inpeople with chronic diseases, but it often goes unnoticed. Many people have concerns about seeking treatment for a mental health problem. You may think it's a sign of weakness, or you don't want people to know about it. It's important to overcome these reasons for not seeking treatment. Treating depression or anxiety is good for your health. Follow-up care is a key part of your treatment and safety. Be sure to make and go to all appointments, and call your doctor if you are having problems. It's also a good idea to know your test results and keep alist of the medicines you take. How can you care for yourself at home? Watch for symptoms of depression  The symptoms of depression are often subtle at first. You may think they are caused by your disease rather than depression. Or you may think it is normal ibis depressed when you have a chronic disease. If you are depressed you may:   Feel sad or hopeless.  Feel guilty or worthless.    Not enjoy the things you used to enjoy.  Maria E Her Feel hopeless, as though life is not worth living.  Have trouble thinking or remembering.  Have low energy, and you may not eat or sleep well.  Pull away from others.  Think often about death or killing yourself. If you or someone you know talks about suicide, self-harm, or feeling hopeless, get help right away. Call the [address] [place] [address] at 4-550-572-TALK (1-390.903.1796) or text HOME to 755235 to access the [employer]. Consider saving these numbers in your phone. Get treatment  By treating your depression, you can feel more hopeful and have more energy. If you feel better, you may take better care of yourself, so your health mayimprove.  Talk to your doctor if you have any changes in mood during treatment for your disease.  Ask your doctor for help. Counseling, antidepressant medicine, or a combination of the two can help most people with depression. Often a combination works best. Counseling can also help you cope with having a chronic disease. When should you call for help? Call 911 anytime you think you may need emergency care. For example, call if:     You feel like hurting yourself or someone else.      Someone you know has depression and is about to attempt or is attempting suicide. If you or someone you know talks about suicide, self-harm, or feeling hopeless, get help right away. Call the [address] [place] [address] at 5-632-277-TALK (2-827.428.9703) or text HOME to 087116 to access the [employer]. Consider saving these numbers in your phone. Call your doctor now or seek immediate medical care if:     You hear voices.      Someone you know has depression and:  ? Starts to give away possessions. ? Uses illegal drugs or drinks alcohol heavily. ? Talks or writes about death, including writing suicide notes or talking about guns, knives, or pills. ? Starts to spend a lot of time alone. ? Acts very aggressively or suddenly appears calm. Watch closely for changes in your health, and be sure to contact your doctor if:     You do not get better as expected. Where can you learn more? Go to [email or web address].[email or web address] and sign in to your [employer] account. Enter M371 in the [employer] box to learn more about \"Depression and Chronic Disease: Care Instructions. \"     If you do not have an account, please click on the \"Sign Up Now\" link. Current as of: February 9, 2022               Content Version: 13.3  © 9918-7046 [employer]. Care instructions adapted under license by [facility] ([facility]). If you have questions about a medical condition or this instruction, always ask your healthcare professional. [address] any warranty or liability for your use of this information.

## 2022-06-23 DIAGNOSIS — Z30.8 ENCOUNTER FOR OTHER CONTRACEPTIVE MANAGEMENT: ICD-10-CM

## 2022-06-23 RX ORDER — DROSPIRENONE AND ETHINYL ESTRADIOL 0.02-3(28)
1 KIT ORAL DAILY
Qty: 84 TABLET | Refills: 3 | Status: SHIPPED | OUTPATIENT
Start: 2022-06-23

## 2023-03-05 NOTE — ANESTHESIA PRE-OP
current facility-administered medications on file prior to encounter. Current Outpatient Prescriptions   Medication Sig Dispense Refill    SPRINTEC 28 0.25-35 MG-MCG per tablet TAKE 1 TABLET BY MOUTH EVERY DAY 84 tablet 2    omeprazole (PRILOSEC) 40 MG delayed release capsule Take 1 capsule by mouth daily 90 capsule 1    metFORMIN (GLUCOPHAGE) 500 MG tablet TAKE 1 TABLET BY MOUTH ONE TIME A DAY WITH BREAKFAST 30 tablet 0    citalopram (CELEXA) 20 MG tablet TAKE 1 TABLET BY MOUTH EVERY DAY 90 tablet 0    albuterol sulfate HFA (PROAIR HFA) 108 (90 BASE) MCG/ACT inhaler Inhale 2 puffs into the lungs every 6 hours as needed for Wheezing 1 Inhaler 1    ondansetron (ZOFRAN) 4 MG tablet Take 1 tablet by mouth daily as needed for Nausea or Vomiting 30 tablet 0    traZODone (DESYREL) 50 MG tablet TAKE 1-2 TABLETS BY MOUTH EVERY DAY IN THE EVENING 60 tablet 3     Current Facility-Administered Medications   Medication Dose Route Frequency Provider Last Rate Last Dose    0.9 % sodium chloride infusion   Intravenous Continuous Yuko Gonzalez MD        famotidine (PEPCID) injection 20 mg  20 mg Intravenous Once Yuko Gonzalez MD        sodium chloride flush 0.9 % injection 10 mL  10 mL Intravenous 2 times per day Yuko Gonzalez MD        sodium chloride flush 0.9 % injection 10 mL  10 mL Intravenous PRN Yuko Gonzalez MD         Vital Signs (Current) There were no vitals filed for this visit. Vital Signs Statistics (for past 48 hrs)     No Data Recorded    BP Readings from Last 3 Encounters:   08/17/17 (!) 124/94   02/15/17 124/76   03/12/16 110/70     BMI  There is no height or weight on file to calculate BMI. Estimated body mass index is 52.49 kg/m² as calculated from the following:    Height as of 10/4/17: 5' 2\" (1.575 m). Weight as of 10/4/17: 287 lb (130.2 kg).     CBC   Lab Results   Component Value Date    WBC 7.6 08/17/2017    RBC 4.09 08/17/2017    HGB 11.6 08/17/2017    HCT 36.2 08/17/2017    MCV 88.4 08/17/2017    RDW 15.8 08/17/2017     08/17/2017     CMP    Lab Results   Component Value Date     08/17/2017    K 3.9 08/17/2017     08/17/2017    CO2 24 08/17/2017    BUN 10 08/17/2017    CREATININE <0.5 08/17/2017    GFRAA >60 08/17/2017    GFRAA >60 12/11/2010    AGRATIO 1.4 08/17/2017    LABGLOM >60 08/17/2017    GLUCOSE 97 08/17/2017    PROT 6.4 08/17/2017    PROT 7.4 12/11/2010    CALCIUM 8.9 08/17/2017    BILITOT <0.2 08/17/2017    ALKPHOS 101 08/17/2017    AST 20 08/17/2017    ALT 28 08/17/2017     BMP    Lab Results   Component Value Date     08/17/2017    K 3.9 08/17/2017     08/17/2017    CO2 24 08/17/2017    BUN 10 08/17/2017    CREATININE <0.5 08/17/2017    CALCIUM 8.9 08/17/2017    GFRAA >60 08/17/2017    GFRAA >60 12/11/2010    LABGLOM >60 08/17/2017    GLUCOSE 97 08/17/2017     POCGlucose  No results for input(s): GLUCOSE in the last 72 hours. Coags  No results found for: PROTIME, INR, APTT  HCG (If Applicable) No results found for: PREGTESTUR, PREGSERUM, HCG, HCGQUANT   ABGs No results found for: PHART, PO2ART, JIJ8VUF, GJQ1KPA, BEART, C0XATNIX   Type & Screen (If Applicable)  No results found for: LABABO, LABRH    NPO Status:                                                     clears 0620                             BMI:   Wt Readings from Last 3 Encounters:   10/04/17 287 lb (130.2 kg)   08/17/17 289 lb 6.4 oz (131.3 kg)   02/15/17 287 lb (130.2 kg)     There is no height or weight on file to calculate BMI.     Anesthesia Evaluation  Patient summary reviewed no history of anesthetic complications:   Airway: Mallampati: II  TM distance: >3 FB   Neck ROM: full  Mouth opening: > = 3 FB Dental: normal exam         Pulmonary: breath sounds clear to auscultation  (+) asthma:     (-) shortness of breath, recent URI and sleep apnea                           Cardiovascular:        (-) hypertension, valvular problems/murmurs, no

## 2023-07-23 DIAGNOSIS — Z30.8 ENCOUNTER FOR OTHER CONTRACEPTIVE MANAGEMENT: ICD-10-CM

## 2023-07-23 DIAGNOSIS — F32.A DEPRESSION, UNSPECIFIED DEPRESSION TYPE: ICD-10-CM

## 2023-07-24 RX ORDER — CITALOPRAM 40 MG/1
TABLET ORAL
Qty: 90 TABLET | Refills: 0 | OUTPATIENT
Start: 2023-07-24

## 2023-07-24 RX ORDER — BUPROPION HYDROCHLORIDE 150 MG/1
TABLET ORAL
Qty: 90 TABLET | Refills: 0 | OUTPATIENT
Start: 2023-07-24

## 2023-07-24 RX ORDER — DROSPIRENONE AND ETHINYL ESTRADIOL TABLETS 0.02-3(28)
KIT ORAL
Qty: 84 TABLET | Refills: 0 | OUTPATIENT
Start: 2023-07-24

## 2023-07-24 NOTE — TELEPHONE ENCOUNTER
Medication:   Requested Prescriptions     Pending Prescriptions Disp Refills    LORYNA 3-0.02 MG per tablet [Pharmacy Med Name: Craig Leekrupa 3-0.02 MG Oral Tablet] 84 tablet 0     Sig: Take 1 tablet by mouth once daily    citalopram (CELEXA) 40 MG tablet [Pharmacy Med Name: Citalopram Hydrobromide 40 MG Oral Tablet] 90 tablet 0     Sig: Take 1 tablet by mouth once daily    buPROPion (WELLBUTRIN XL) 150 MG extended release tablet [Pharmacy Med Name: buPROPion HCl ER (XL) 150 MG Oral Tablet Extended Release 24 Hour] 90 tablet 0     Sig: TAKE 1 TABLET BY MOUTH ONCE DAILY IN THE MORNING            Last appt: 6/22/2022   Next appt: Visit date not found    Last OARRS: No flowsheet data found.

## 2023-08-08 ENCOUNTER — OFFICE VISIT (OUTPATIENT)
Dept: FAMILY MEDICINE CLINIC | Age: 28
End: 2023-08-08
Payer: COMMERCIAL

## 2023-08-08 VITALS
SYSTOLIC BLOOD PRESSURE: 118 MMHG | HEART RATE: 87 BPM | BODY MASS INDEX: 48.18 KG/M2 | RESPIRATION RATE: 14 BRPM | OXYGEN SATURATION: 99 % | DIASTOLIC BLOOD PRESSURE: 78 MMHG | HEIGHT: 63 IN

## 2023-08-08 DIAGNOSIS — F33.2 SEVERE EPISODE OF RECURRENT MAJOR DEPRESSIVE DISORDER, WITHOUT PSYCHOTIC FEATURES (HCC): Primary | ICD-10-CM

## 2023-08-08 DIAGNOSIS — Z30.8 ENCOUNTER FOR OTHER CONTRACEPTIVE MANAGEMENT: ICD-10-CM

## 2023-08-08 DIAGNOSIS — E28.2 PCOS (POLYCYSTIC OVARIAN SYNDROME): ICD-10-CM

## 2023-08-08 PROCEDURE — 99214 OFFICE O/P EST MOD 30 MIN: CPT | Performed by: NURSE PRACTITIONER

## 2023-08-08 RX ORDER — DROSPIRENONE AND ETHINYL ESTRADIOL 0.02-3(28)
1 KIT ORAL DAILY
Qty: 84 TABLET | Refills: 3 | Status: SHIPPED | OUTPATIENT
Start: 2023-08-08

## 2023-08-08 SDOH — ECONOMIC STABILITY: INCOME INSECURITY: HOW HARD IS IT FOR YOU TO PAY FOR THE VERY BASICS LIKE FOOD, HOUSING, MEDICAL CARE, AND HEATING?: VERY HARD

## 2023-08-08 SDOH — ECONOMIC STABILITY: HOUSING INSECURITY
IN THE LAST 12 MONTHS, WAS THERE A TIME WHEN YOU DID NOT HAVE A STEADY PLACE TO SLEEP OR SLEPT IN A SHELTER (INCLUDING NOW)?: NO

## 2023-08-08 SDOH — ECONOMIC STABILITY: FOOD INSECURITY: WITHIN THE PAST 12 MONTHS, THE FOOD YOU BOUGHT JUST DIDN'T LAST AND YOU DIDN'T HAVE MONEY TO GET MORE.: OFTEN TRUE

## 2023-08-08 SDOH — ECONOMIC STABILITY: FOOD INSECURITY: WITHIN THE PAST 12 MONTHS, YOU WORRIED THAT YOUR FOOD WOULD RUN OUT BEFORE YOU GOT MONEY TO BUY MORE.: OFTEN TRUE

## 2023-08-08 ASSESSMENT — PATIENT HEALTH QUESTIONNAIRE - PHQ9
SUM OF ALL RESPONSES TO PHQ QUESTIONS 1-9: 23
SUM OF ALL RESPONSES TO PHQ9 QUESTIONS 1 & 2: 6
SUM OF ALL RESPONSES TO PHQ QUESTIONS 1-9: 23
2. FEELING DOWN, DEPRESSED OR HOPELESS: 3
8. MOVING OR SPEAKING SO SLOWLY THAT OTHER PEOPLE COULD HAVE NOTICED. OR THE OPPOSITE, BEING SO FIGETY OR RESTLESS THAT YOU HAVE BEEN MOVING AROUND A LOT MORE THAN USUAL: 0
7. TROUBLE CONCENTRATING ON THINGS, SUCH AS READING THE NEWSPAPER OR WATCHING TELEVISION: 3
SUM OF ALL RESPONSES TO PHQ QUESTIONS 1-9: 21
4. FEELING TIRED OR HAVING LITTLE ENERGY: 3
SUM OF ALL RESPONSES TO PHQ QUESTIONS 1-9: 23
10. IF YOU CHECKED OFF ANY PROBLEMS, HOW DIFFICULT HAVE THESE PROBLEMS MADE IT FOR YOU TO DO YOUR WORK, TAKE CARE OF THINGS AT HOME, OR GET ALONG WITH OTHER PEOPLE: 2
5. POOR APPETITE OR OVEREATING: 3
3. TROUBLE FALLING OR STAYING ASLEEP: 3
6. FEELING BAD ABOUT YOURSELF - OR THAT YOU ARE A FAILURE OR HAVE LET YOURSELF OR YOUR FAMILY DOWN: 3
9. THOUGHTS THAT YOU WOULD BE BETTER OFF DEAD, OR OF HURTING YOURSELF: 2
1. LITTLE INTEREST OR PLEASURE IN DOING THINGS: 3

## 2023-08-08 ASSESSMENT — ENCOUNTER SYMPTOMS
SHORTNESS OF BREATH: 0
CHEST TIGHTNESS: 0
COUGH: 0
GASTROINTESTINAL NEGATIVE: 1
WHEEZING: 0

## 2023-08-08 ASSESSMENT — COLUMBIA-SUICIDE SEVERITY RATING SCALE - C-SSRS
6. HAVE YOU EVER DONE ANYTHING, STARTED TO DO ANYTHING, OR PREPARED TO DO ANYTHING TO END YOUR LIFE?: YES
1. WITHIN THE PAST MONTH, HAVE YOU WISHED YOU WERE DEAD OR WISHED YOU COULD GO TO SLEEP AND NOT WAKE UP?: YES
2. HAVE YOU ACTUALLY HAD ANY THOUGHTS OF KILLING YOURSELF?: NO
7. DID THIS OCCUR IN THE LAST THREE MONTHS: NO
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
## 2023-09-05 ENCOUNTER — OFFICE VISIT (OUTPATIENT)
Dept: PSYCHOLOGY | Age: 28
End: 2023-09-05

## 2023-09-05 ENCOUNTER — TELEPHONE (OUTPATIENT)
Dept: FAMILY MEDICINE CLINIC | Age: 28
End: 2023-09-05

## 2023-09-05 DIAGNOSIS — F33.2 SEVERE EPISODE OF RECURRENT MAJOR DEPRESSIVE DISORDER, WITHOUT PSYCHOTIC FEATURES (HCC): Primary | ICD-10-CM

## 2023-09-05 NOTE — TELEPHONE ENCOUNTER
Patient was in office today, and wanted to let Alejandro Abel know that the below mediation that she was on was causing her extreme nausea and vomitting. Pt stopped taking medication like 2-3 days ago and feels a lot better pt said below medication did not help her while she was on it for the month.     Pt will like to know what other alternatives can she take for the below medication  sertraline (ZOLOFT) 50 MG tablet

## 2023-09-07 NOTE — PROGRESS NOTES
[address], Ph.D.  Licensed Clinical Psychologist  Date:  9/5/23        Time spent with client:  60 minutes from 3:00 - 4:00 pm.  This is patient's first psychotherapy appointment with Dr. Vero Olivier. Chief Complaint   Patient presents with    Depression    New Patient      S:  Patient is a 24-year--old, partnered, never ,  / woman  Lives with boyfriend she met 12/22 - concerns about his anger, substance use  Works third shift at [employer]  Family history  Parents are not together - patient is the only child of her mother  Relationship with mother has been \"up and down\" - she has diabetes, severe neuropathy  Mother lives in a nursing home  Mother's family members are disengaged - do not demonstrate caring about her or mother  Relationship with father - he has been \"in and out\" of her life, he lives in [place], intermittent contact  Father has 4 other much-older children and step-children, she did not grow up with any of them  Diagnosed with Polycystic Ovarian Syndrome (PCOS) - heavy periods  Current depressive symptoms: depressed mood, fatigue, decreased concentration, feeling less hopeful, feeling empty, sleep disturbance, decreased appetite  Recently chronic depressive symptoms have worsened  Recently was taking antidepressant medication - stopped due to intolerable side effect of vomiting  Depressed \"since childhood\"           O:  MSE:  Appearance:  alert, cooperative, moderate distress   Appetite:  Normal  Sleep disturbance:  Yes   Fatigue:  Yes   Loss of pleasure:  No   Impulsive behavior:  No  Speech:   Within normal limits  Mood:  euthymic   Affect:  normal affect  Thought Content:  intact   Thought Process:  Coherent   Associations:  logical connections  Insight:  Good   Judgment:  Intact   Orientation:  oriented to person, place, time, and general circumstances   Memory:  recent and remote memory

## 2023-09-19 ENCOUNTER — OFFICE VISIT (OUTPATIENT)
Dept: PSYCHOLOGY | Age: 28
End: 2023-09-19
Payer: COMMERCIAL

## 2023-09-19 DIAGNOSIS — F33.2 SEVERE EPISODE OF RECURRENT MAJOR DEPRESSIVE DISORDER, WITHOUT PSYCHOTIC FEATURES (HCC): Primary | ICD-10-CM

## 2023-09-19 PROCEDURE — 90837 PSYTX W PT 60 MINUTES: CPT | Performed by: PSYCHOLOGIST

## 2023-09-21 ENCOUNTER — OFFICE VISIT (OUTPATIENT)
Dept: FAMILY MEDICINE CLINIC | Age: 28
End: 2023-09-21
Payer: COMMERCIAL

## 2023-09-21 VITALS
HEART RATE: 82 BPM | HEIGHT: 63 IN | OXYGEN SATURATION: 99 % | DIASTOLIC BLOOD PRESSURE: 80 MMHG | RESPIRATION RATE: 14 BRPM | SYSTOLIC BLOOD PRESSURE: 128 MMHG | BODY MASS INDEX: 48.18 KG/M2

## 2023-09-21 DIAGNOSIS — F33.2 SEVERE EPISODE OF RECURRENT MAJOR DEPRESSIVE DISORDER, WITHOUT PSYCHOTIC FEATURES (HCC): Primary | ICD-10-CM

## 2023-09-21 PROCEDURE — 99214 OFFICE O/P EST MOD 30 MIN: CPT | Performed by: NURSE PRACTITIONER

## 2023-09-21 RX ORDER — CITALOPRAM 20 MG/1
20 TABLET ORAL DAILY
Qty: 90 TABLET | Refills: 1 | Status: SHIPPED | OUTPATIENT
Start: 2023-09-21

## 2023-09-21 ASSESSMENT — PATIENT HEALTH QUESTIONNAIRE - PHQ9
5. POOR APPETITE OR OVEREATING: 3
9. THOUGHTS THAT YOU WOULD BE BETTER OFF DEAD, OR OF HURTING YOURSELF: 1
8. MOVING OR SPEAKING SO SLOWLY THAT OTHER PEOPLE COULD HAVE NOTICED. OR THE OPPOSITE, BEING SO FIGETY OR RESTLESS THAT YOU HAVE BEEN MOVING AROUND A LOT MORE THAN USUAL: 0
SUM OF ALL RESPONSES TO PHQ9 QUESTIONS 1 & 2: 6
3. TROUBLE FALLING OR STAYING ASLEEP: 3
10. IF YOU CHECKED OFF ANY PROBLEMS, HOW DIFFICULT HAVE THESE PROBLEMS MADE IT FOR YOU TO DO YOUR WORK, TAKE CARE OF THINGS AT HOME, OR GET ALONG WITH OTHER PEOPLE: 2
4. FEELING TIRED OR HAVING LITTLE ENERGY: 3
SUM OF ALL RESPONSES TO PHQ QUESTIONS 1-9: 20
SUM OF ALL RESPONSES TO PHQ QUESTIONS 1-9: 19
6. FEELING BAD ABOUT YOURSELF - OR THAT YOU ARE A FAILURE OR HAVE LET YOURSELF OR YOUR FAMILY DOWN: 3
SUM OF ALL RESPONSES TO PHQ QUESTIONS 1-9: 20
SUM OF ALL RESPONSES TO PHQ QUESTIONS 1-9: 20
7. TROUBLE CONCENTRATING ON THINGS, SUCH AS READING THE NEWSPAPER OR WATCHING TELEVISION: 1
1. LITTLE INTEREST OR PLEASURE IN DOING THINGS: 3
2. FEELING DOWN, DEPRESSED OR HOPELESS: 3

## 2023-09-21 ASSESSMENT — ENCOUNTER SYMPTOMS
GASTROINTESTINAL NEGATIVE: 1
WHEEZING: 0
COUGH: 0
SHORTNESS OF BREATH: 0
CHEST TIGHTNESS: 0

## 2023-09-21 ASSESSMENT — COLUMBIA-SUICIDE SEVERITY RATING SCALE - C-SSRS
6. HAVE YOU EVER DONE ANYTHING, STARTED TO DO ANYTHING, OR PREPARED TO DO ANYTHING TO END YOUR LIFE?: NO
1. WITHIN THE PAST MONTH, HAVE YOU WISHED YOU WERE DEAD OR WISHED YOU COULD GO TO SLEEP AND NOT WAKE UP?: YES
2. HAVE YOU ACTUALLY HAD ANY THOUGHTS OF KILLING YOURSELF?: NO
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
## 2023-09-26 ENCOUNTER — OFFICE VISIT (OUTPATIENT)
Dept: PSYCHOLOGY | Age: 28
End: 2023-09-26
Payer: COMMERCIAL

## 2023-09-26 DIAGNOSIS — F33.2 SEVERE EPISODE OF RECURRENT MAJOR DEPRESSIVE DISORDER, WITHOUT PSYCHOTIC FEATURES (HCC): Primary | ICD-10-CM

## 2023-09-26 PROCEDURE — 90837 PSYTX W PT 60 MINUTES: CPT | Performed by: PSYCHOLOGIST
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
## 2023-10-10 ENCOUNTER — OFFICE VISIT (OUTPATIENT)
Dept: PSYCHOLOGY | Age: 28
End: 2023-10-10
Payer: COMMERCIAL

## 2023-10-10 DIAGNOSIS — F33.2 SEVERE EPISODE OF RECURRENT MAJOR DEPRESSIVE DISORDER, WITHOUT PSYCHOTIC FEATURES (HCC): Primary | ICD-10-CM

## 2023-10-10 PROCEDURE — 90837 PSYTX W PT 60 MINUTES: CPT | Performed by: PSYCHOLOGIST

## 2023-10-23 NOTE — PROGRESS NOTES
Mohit Medina, Ph.D.  Licensed Clinical Psychologist  Date:  10/10/23           Time spent with client:  60 minutes from 12:00 - 1:00 pm.  This is patient's 4th psychotherapy appointment with Dr. Jaun Snyder. Chief Complaint   Patient presents with    Depression    Anxiety      S:  Signed 6 month lease - relationship w/ ex-boyfriend has improved, living together more amicably  Relationship with godmother - she offered to let pt stay there but \"her stipulations were ridiculous\"  Got car fixed by friend  Family history - care-giver fatigue related to caring for mother w/ numerous severe health problems  Mother is in nursing home and will be there permanently - diagnosed with diabetes, 5 eye diseases  Relationship with mother - experienced mother as demanding, manipulative  Patient is  - bullied for this, mother is , father is black     O:  MSE:  Appearance:  Alert, cooperative, moderate distress   Appetite:  Increased  Sleep disturbance:  Yes - both insomnia and hypersomnia  Fatigue:  Yes   Loss of pleasure:  Yes   Impulsive behavior:  Yes (recent but not current)  Speech:   Within normal limits  Mood:  Mildly depressed  Affect:  Full range with anxiety and depression  Thought Content:  Intact   Thought Process:  Coherent   Associations:  Logical connections  Insight:  Fair  Judgment:  Fair (improving)  Orientation:  Oriented to person, place, time, and general circumstances   Memory:  Recent and remote memory intact  Attention/Concentration:  Intact  Morbid ideation:  No   Threat Assessment: No history of any suicidal ideation (with intent or plan), suicide attempts, self-harm urges/behaviors, or homicidal ideation/behavior   Protective Factors against Suicide: Adequate family/social support, contacts reliable for safety, future oriented/reason to live, Zoroastrianism beliefs/value system, and responsibilities     A:  Lucero Hodan presented for a 4th psychotherapy

## 2023-10-24 ENCOUNTER — OFFICE VISIT (OUTPATIENT)
Dept: PSYCHOLOGY | Age: 28
End: 2023-10-24
Payer: COMMERCIAL

## 2023-10-24 DIAGNOSIS — F33.1 MODERATE EPISODE OF RECURRENT MAJOR DEPRESSIVE DISORDER (HCC): Primary | ICD-10-CM

## 2023-10-24 PROCEDURE — 90837 PSYTX W PT 60 MINUTES: CPT | Performed by: PSYCHOLOGIST

## 2023-10-28 NOTE — PROGRESS NOTES
[address] Eddy Seip, Ph.D.  Licensed Clinical Psychologist  Date:  10/24/23           Time spent with client:  60 minutes from 12:00 - 1:00 pm.  This is patient's 5th psychotherapy appointment with Dr. Portillo Pickett. Chief Complaint   Patient presents with    Depression    Family problem      S:  Mood has improved - less anxious, less depressed, \"was super productive last week\"  Relationship with mother - patient was in care-giving role with mother from early childhood on  Made decision ~2 years ago to place mother in nursing home - care-giving was extremely demanding  History of mother \"almost dying\" when she was age ~3 and developing sepsis when she was ~8 - 13  History of working at [employer] for ~7 years - got promoted, \"unjust termination\" from that job     O:  MSE:  Appearance:  Alert, cooperative, moderate distress   Appetite:  Increased  Sleep disturbance:  Yes - both insomnia and hypersomnia  Fatigue:  Yes   Loss of pleasure:  Yes   Impulsive behavior:  Yes (recent but not current)  Speech:   Within normal limits  Mood:  Improving - less anxious and less depressed than last session  Affect:  Full range with anxiety and depression  Thought Content:  Intact   Thought Process:  Coherent   Associations:  Logical connections  Insight:  Fair (improving)  Judgment:  Fair (improving)  Orientation:  Oriented to person, place, time, and general circumstances   Memory:  Recent and remote memory intact  Attention/Concentration:  Intact  Morbid ideation:  No   Threat Assessment:  No history of any suicidal ideation (with intent or plan), suicide attempts, self-harm urges/behaviors, or homicidal ideation/behavior   Protective Factors against Suicide: Adequate family/social support, contacts reliable for safety, future oriented/reason to live, Hinduism beliefs/value system, and responsibilities     A:  Suzi López is addressing concerns related to chronic depression, relationship difficulties,

## 2024-01-09 ENCOUNTER — TELEMEDICINE (OUTPATIENT)
Dept: PSYCHOLOGY | Age: 29
End: 2024-01-09
Payer: COMMERCIAL

## 2024-01-09 DIAGNOSIS — F33.1 MODERATE EPISODE OF RECURRENT MAJOR DEPRESSIVE DISORDER (HCC): Primary | ICD-10-CM

## 2024-01-09 PROCEDURE — 90837 PSYTX W PT 60 MINUTES: CPT | Performed by: PSYCHOLOGIST
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
## 2024-06-04 DIAGNOSIS — Z30.8 ENCOUNTER FOR OTHER CONTRACEPTIVE MANAGEMENT: ICD-10-CM

## 2024-06-04 RX ORDER — DROSPIRENONE AND ETHINYL ESTRADIOL TABLETS 0.02-3(28)
1 KIT ORAL DAILY
Qty: 84 TABLET | Refills: 0 | Status: SHIPPED | OUTPATIENT
Start: 2024-06-04

## 2024-06-04 NOTE — TELEPHONE ENCOUNTER
Medication:   Requested Prescriptions     Pending Prescriptions Disp Refills    LORYNA 3-0.02 MG per tablet [Pharmacy Med Name: Loryna 3-0.02 MG Oral Tablet] 84 tablet 0     Sig: Take 1 tablet by mouth once daily        Last Filled:      Patient Phone Number: [phone] (home) [phone] (work)    Last appt: 9/21/2023   Next appt: Visit date not found    Last OARRS:        No data to display

## 2024-06-07 DIAGNOSIS — Z30.8 ENCOUNTER FOR OTHER CONTRACEPTIVE MANAGEMENT: ICD-10-CM

## 2024-06-07 RX ORDER — DROSPIRENONE AND ETHINYL ESTRADIOL 0.02-3(28)
1 KIT ORAL DAILY
Qty: 84 TABLET | Refills: 0 | OUTPATIENT
Start: 2024-06-07

## 2024-06-07 NOTE — TELEPHONE ENCOUNTER
Medication:   Requested Prescriptions     Pending Prescriptions Disp Refills    drospirenone-ethinyl estradiol (LORYNA) 3-0.02 MG per tablet 84 tablet 0     Sig: Take 1 tablet by mouth daily        Last Filled:  06/04/2024    Patient Phone Number: [phone] (home) [phone] (work)    Last appt: 9/21/2023   Next appt: Visit date not found    Last OARRS:        No data to display

## 2024-06-17 DIAGNOSIS — F33.2 SEVERE EPISODE OF RECURRENT MAJOR DEPRESSIVE DISORDER, WITHOUT PSYCHOTIC FEATURES (HCC): ICD-10-CM

## 2024-06-17 DIAGNOSIS — Z30.8 ENCOUNTER FOR OTHER CONTRACEPTIVE MANAGEMENT: ICD-10-CM

## 2024-06-18 RX ORDER — DROSPIRENONE AND ETHINYL ESTRADIOL 0.02-3(28)
1 KIT ORAL DAILY
Qty: 84 TABLET | Refills: 0 | Status: SHIPPED | OUTPATIENT
Start: 2024-06-18

## 2024-06-18 RX ORDER — CITALOPRAM 20 MG/1
20 TABLET ORAL DAILY
Qty: 90 TABLET | Refills: 1 | Status: SHIPPED | OUTPATIENT
Start: 2024-06-18

## 2024-06-18 NOTE — TELEPHONE ENCOUNTER
Medication:   Requested Prescriptions     Pending Prescriptions Disp Refills    citalopram (CELEXA) 20 MG tablet 90 tablet 1     Sig: Take 1 tablet by mouth daily    drospirenone-ethinyl estradiol (LORYNA) 3-0.02 MG per tablet 84 tablet 0     Sig: Take 1 tablet by mouth daily        Last Filled:  9/21/2023, 90, 1  6/4/2024, 84, 0    Patient Phone Number: [phone] (home) [phone] (work)    Last appt: 9/21/2023   Next appt: LVMT[facility] due for follow up     Last OARRS:        No data to display

## 2024-07-16 ENCOUNTER — OFFICE VISIT (OUTPATIENT)
Dept: PSYCHOLOGY | Age: 29
End: 2024-07-16
Payer: COMMERCIAL

## 2024-07-16 DIAGNOSIS — F33.1 MODERATE EPISODE OF RECURRENT MAJOR DEPRESSIVE DISORDER (HCC): Primary | ICD-10-CM

## 2024-07-16 PROCEDURE — 90837 PSYTX W PT 60 MINUTES: CPT | Performed by: PSYCHOLOGIST

## 2024-07-18 NOTE — PROGRESS NOTES
Behavioral Health Psychotherapy  Jeane Menjivar, Ph.D.  Licensed Clinical Psychologist  Date:  7/16/24           Time spent with client:  60 minutes from 10:00 - 11:00 am.  This is patient's 7th psychotherapy appointment with Dr. Menjivar.           Chief Complaint   Patient presents with    Depression    Grief      S:  Mood has been sad, grieving after recent death of pet cat earlier this month  Drank alcohol, used marijuana to cope with grief - aware this was not wisest decision  Chronic gastro-intestinal symptoms include stomach pain, nausea, vomiting  Helped close female friend move on very hot day  Care-giving relationship w/ mother - she is \"aggravating\", she lives in a nursing home  Mother complains on Facebook, which patient experiences as guilt-tripping  Plan to continue taking antidepressant Celexa - interested in Gene Sight testing     O:  MSE:  Appearance:  Alert, cooperative, moderate distress   Appetite:  Within normal limits  Sleep disturbance:  Within normal limits  Fatigue:  Yes   Loss of pleasure:  Yes   Impulsive behavior:  Yes (recent but not current)  Speech:  Within normal limits  Mood:  Mild depression, acute grief  Affect:  Full range with sadness  Thought Content:  Intact   Thought Process:  Coherent   Associations:  Logical connections  Insight:  Good  Judgment:  Fair  Orientation:  Oriented to person, place, time, and general circumstances   Memory:  Recent and remote memory intact  Attention/Concentration:  Intact  Morbid ideation:  No   Threat Assessment:  No history of any suicidal ideation (with intent or plan), suicide attempts, self-harm urges/behaviors, or homicidal ideation/behavior   Protective Factors against Suicide:  Adequate family/social support, contacts reliable for safety, future oriented/reason to live, Rastafari beliefs/value system, and responsibilities     A:  Kathrin is addressing concerns related to chronic depression, relationship difficulties, and lack of parental

## 2024-08-20 ENCOUNTER — OFFICE VISIT (OUTPATIENT)
Dept: FAMILY MEDICINE CLINIC | Age: 29
End: 2024-08-20
Payer: COMMERCIAL

## 2024-08-20 VITALS
RESPIRATION RATE: 16 BRPM | DIASTOLIC BLOOD PRESSURE: 80 MMHG | HEIGHT: 63 IN | HEART RATE: 84 BPM | BODY MASS INDEX: 48.18 KG/M2 | TEMPERATURE: 97.4 F | OXYGEN SATURATION: 99 % | SYSTOLIC BLOOD PRESSURE: 126 MMHG

## 2024-08-20 DIAGNOSIS — N89.8 VAGINAL ITCHING: Primary | ICD-10-CM

## 2024-08-20 PROCEDURE — 99214 OFFICE O/P EST MOD 30 MIN: CPT | Performed by: NURSE PRACTITIONER

## 2024-08-20 RX ORDER — KETOCONAZOLE 20 MG/G
CREAM TOPICAL
Qty: 30 G | Refills: 3 | Status: SHIPPED | OUTPATIENT
Start: 2024-08-20

## 2024-08-20 RX ORDER — TRIAMCINOLONE ACETONIDE 1 MG/G
OINTMENT TOPICAL 2 TIMES DAILY
Qty: 30 G | Refills: 3 | Status: SHIPPED | OUTPATIENT
Start: 2024-08-20 | End: 2024-08-27

## 2024-08-20 SDOH — ECONOMIC STABILITY: FOOD INSECURITY: WITHIN THE PAST 12 MONTHS, THE FOOD YOU BOUGHT JUST DIDN'T LAST AND YOU DIDN'T HAVE MONEY TO GET MORE.: SOMETIMES TRUE

## 2024-08-20 SDOH — ECONOMIC STABILITY: FOOD INSECURITY: WITHIN THE PAST 12 MONTHS, YOU WORRIED THAT YOUR FOOD WOULD RUN OUT BEFORE YOU GOT MONEY TO BUY MORE.: SOMETIMES TRUE

## 2024-08-20 SDOH — ECONOMIC STABILITY: INCOME INSECURITY: HOW HARD IS IT FOR YOU TO PAY FOR THE VERY BASICS LIKE FOOD, HOUSING, MEDICAL CARE, AND HEATING?: HARD

## 2024-08-20 ASSESSMENT — PATIENT HEALTH QUESTIONNAIRE - PHQ9
SUM OF ALL RESPONSES TO PHQ QUESTIONS 1-9: 12
10. IF YOU CHECKED OFF ANY PROBLEMS, HOW DIFFICULT HAVE THESE PROBLEMS MADE IT FOR YOU TO DO YOUR WORK, TAKE CARE OF THINGS AT HOME, OR GET ALONG WITH OTHER PEOPLE: VERY DIFFICULT
SUM OF ALL RESPONSES TO PHQ9 QUESTIONS 1 & 2: 2
5. POOR APPETITE OR OVEREATING: SEVERAL DAYS
2. FEELING DOWN, DEPRESSED OR HOPELESS: SEVERAL DAYS
7. TROUBLE CONCENTRATING ON THINGS, SUCH AS READING THE NEWSPAPER OR WATCHING TELEVISION: MORE THAN HALF THE DAYS
1. LITTLE INTEREST OR PLEASURE IN DOING THINGS: SEVERAL DAYS
SUM OF ALL RESPONSES TO PHQ QUESTIONS 1-9: 12
9. THOUGHTS THAT YOU WOULD BE BETTER OFF DEAD, OR OF HURTING YOURSELF: NOT AT ALL
3. TROUBLE FALLING OR STAYING ASLEEP: SEVERAL DAYS
8. MOVING OR SPEAKING SO SLOWLY THAT OTHER PEOPLE COULD HAVE NOTICED. OR THE OPPOSITE, BEING SO FIGETY OR RESTLESS THAT YOU HAVE BEEN MOVING AROUND A LOT MORE THAN USUAL: NOT AT ALL
6. FEELING BAD ABOUT YOURSELF - OR THAT YOU ARE A FAILURE OR HAVE LET YOURSELF OR YOUR FAMILY DOWN: NEARLY EVERY DAY
SUM OF ALL RESPONSES TO PHQ QUESTIONS 1-9: 12
SUM OF ALL RESPONSES TO PHQ QUESTIONS 1-9: 12
4. FEELING TIRED OR HAVING LITTLE ENERGY: NEARLY EVERY DAY

## 2024-08-20 ASSESSMENT — ENCOUNTER SYMPTOMS
COUGH: 0
WHEEZING: 0
CHEST TIGHTNESS: 0
GASTROINTESTINAL NEGATIVE: 1
SHORTNESS OF BREATH: 0

## 2024-08-20 NOTE — PROGRESS NOTES
2024     Kathrin Emery (:  1995) is a 28 y.o. female, here for evaluation of the following medical concerns:    Chief Complaint   Patient presents with    Vaginal Itching     X1 week     Vaginal itching started about one week ago.  Patient states she believes this is happening because her partner has several cavities.  Denies rash, itching is external.  No discharge or odors.  She was seen at [facility] and was treated with Diflucan on Wednesday of last week, took two doses one week apart, no change in symptoms.  Itching is worse at night.  Denies urinary symptoms.  States she is unable to urinate today and would like to bring in a sample tomorrow morning.  She did just start her period last night.      Review of Systems   Constitutional:  Negative for chills, diaphoresis, fatigue and fever.   Respiratory:  Negative for cough, chest tightness, shortness of breath and wheezing.    Cardiovascular:  Negative for chest pain and palpitations.   Gastrointestinal: Negative.    Genitourinary: Negative.    Neurological:  Negative for dizziness, weakness and headaches.     Prior to Visit Medications    Medication Sig Taking? Authorizing Provider   ketoconazole (NIZORAL) 2 % cream Apply topically daily. Yes Dina Rivera APRN - CNP   triamcinolone (KENALOG) 0.1 % ointment Apply topically 2 times daily for 7 days Yes Dina Rivera APRN - CNP   citalopram (CELEXA) 20 MG tablet Take 1 tablet by mouth daily Yes Dina Rivera APRN - CNP   drospirenone-ethinyl estradiol (LORYNA) 3-0.02 MG per tablet Take 1 tablet by mouth daily Yes Dina Rivera APRN - CNP      Social History     Tobacco Use    Smoking status: Never    Smokeless tobacco: Never   Substance Use Topics    Alcohol use: Not Currently     Comment: social     Drug use: No      Vitals:    24 0807   BP: 126/80   Site: Left Upper Arm   Position: Sitting   Cuff Size: Large Adult   Pulse: 84   Resp: 16   Temp: 97.4 °F (36.3 °C)   TempSrc:

## 2024-08-21 DIAGNOSIS — B96.89 BACTERIAL VAGINOSIS: Primary | ICD-10-CM

## 2024-08-21 DIAGNOSIS — N76.0 BACTERIAL VAGINOSIS: Primary | ICD-10-CM

## 2024-08-21 LAB
CANDIDA DNA VAG QL NAA+PROBE: ABNORMAL
G VAGINALIS DNA SPEC QL NAA+PROBE: ABNORMAL
T VAGINALIS DNA VAG QL NAA+PROBE: ABNORMAL

## 2024-08-21 RX ORDER — METRONIDAZOLE 500 MG/1
500 TABLET ORAL 2 TIMES DAILY
Qty: 14 TABLET | Refills: 0 | Status: SHIPPED | OUTPATIENT
Start: 2024-08-21 | End: 2024-08-28

## 2024-08-22 ENCOUNTER — TELEPHONE (OUTPATIENT)
Dept: FAMILY MEDICINE CLINIC | Age: 29
End: 2024-08-22

## 2024-08-22 ENCOUNTER — OFFICE VISIT (OUTPATIENT)
Dept: PSYCHOLOGY | Age: 29
End: 2024-08-22
Payer: COMMERCIAL

## 2024-08-22 DIAGNOSIS — F33.1 MODERATE EPISODE OF RECURRENT MAJOR DEPRESSIVE DISORDER (HCC): Primary | ICD-10-CM

## 2024-08-22 DIAGNOSIS — N89.8 VAGINAL ITCHING: ICD-10-CM

## 2024-08-22 PROCEDURE — 99212 OFFICE O/P EST SF 10 MIN: CPT | Performed by: PSYCHOLOGIST

## 2024-08-24 LAB
C TRACH DNA UR QL NAA+PROBE: NEGATIVE
N GONORRHOEA DNA UR QL NAA+PROBE: NEGATIVE

## 2024-10-10 NOTE — PROGRESS NOTES
Behavioral Health Psychotherapy  Jeane Menjivar, Ph.D.  Licensed Clinical Psychologist  Date:  8/22/24           Time spent with client:  10 minutes from 10:00 - 10:10 am.  This is patient's 8th psychotherapy appointment with Dr. Menjivar.           Chief Complaint   Patient presents with    Depression    Stress      S:  Mood has been mildly sad  Brief check-in session today  Dating relationship  Work challenges  Decision-making about priorities     O:  MSE:  Appearance:  Alert, cooperative, minimal distress   Appetite:  Within normal limits  Sleep disturbance:  Within normal limits  Fatigue:  No  Loss of pleasure:  No   Impulsive behavior:  Yes (recent but not current)  Speech:  Within normal limits  Mood:  Mild depression  Affect:  Full range with sadness  Thought Content:  Intact   Thought Process:  Coherent   Associations:  Logical connections  Insight:  Good  Judgment:  Fair  Orientation:  Oriented to person, place, time, and general circumstances   Memory:  Recent and remote memory intact  Attention/Concentration:  Intact  Morbid ideation:  No   Threat Assessment:  No history of any suicidal ideation (with intent or plan), suicide attempts, self-harm urges/behaviors, or homicidal ideation/behavior   Protective Factors against Suicide:  Adequate family/social support, contacts reliable for safety, future oriented/reason to live, Roman Catholic beliefs/value system, and responsibilities     A:  Kathrin is addressing concerns related to chronic depression, relationship difficulties, and lack of parental support.  She is dating and enjoying herself more in recent weeks.     Diagnosis:  1. Moderate episode of recurrent major depressive disorder         Plan:  Pt interventions:  Decrease depressive symptoms  Increase coping skills  Scheduled another session for 9/26/24

## 2025-08-28 DIAGNOSIS — F33.2 SEVERE EPISODE OF RECURRENT MAJOR DEPRESSIVE DISORDER, WITHOUT PSYCHOTIC FEATURES (HCC): ICD-10-CM

## 2025-08-28 RX ORDER — CITALOPRAM HYDROBROMIDE 20 MG/1
20 TABLET ORAL DAILY
Qty: 90 TABLET | Refills: 0 | Status: SHIPPED | OUTPATIENT
Start: 2025-08-28